# Patient Record
Sex: MALE | Race: WHITE | NOT HISPANIC OR LATINO | Employment: FULL TIME | URBAN - METROPOLITAN AREA
[De-identification: names, ages, dates, MRNs, and addresses within clinical notes are randomized per-mention and may not be internally consistent; named-entity substitution may affect disease eponyms.]

---

## 2017-03-02 ENCOUNTER — LAB (OUTPATIENT)
Dept: LAB | Facility: HOSPITAL | Age: 52
End: 2017-03-02

## 2017-03-02 ENCOUNTER — CONSULT (OUTPATIENT)
Dept: ONCOLOGY | Facility: CLINIC | Age: 52
End: 2017-03-02

## 2017-03-02 VITALS
WEIGHT: 159.8 LBS | DIASTOLIC BLOOD PRESSURE: 68 MMHG | OXYGEN SATURATION: 97 % | BODY MASS INDEX: 22.37 KG/M2 | RESPIRATION RATE: 14 BRPM | HEIGHT: 71 IN | HEART RATE: 90 BPM | SYSTOLIC BLOOD PRESSURE: 126 MMHG | TEMPERATURE: 98 F

## 2017-03-02 DIAGNOSIS — D72.820 LYMPHOCYTOSIS: Primary | ICD-10-CM

## 2017-03-02 DIAGNOSIS — D72.829 LEUKOCYTOSIS, UNSPECIFIED TYPE: Primary | ICD-10-CM

## 2017-03-02 LAB
BASOPHILS # BLD AUTO: 0.04 10*3/MM3 (ref 0–0.1)
BASOPHILS NFR BLD AUTO: 0.5 % (ref 0–1.1)
DEPRECATED RDW RBC AUTO: 46.2 FL (ref 37–49)
EOSINOPHIL # BLD AUTO: 0.16 10*3/MM3 (ref 0–0.36)
EOSINOPHIL NFR BLD AUTO: 2 % (ref 1–5)
ERYTHROCYTE [DISTWIDTH] IN BLOOD BY AUTOMATED COUNT: 13.6 % (ref 11.7–14.5)
HCT VFR BLD AUTO: 43 % (ref 40–49)
HGB BLD-MCNC: 14.4 G/DL (ref 13.5–16.5)
HOLD SPECIMEN: NORMAL
IMM GRANULOCYTES # BLD: 0.02 10*3/MM3 (ref 0–0.03)
IMM GRANULOCYTES NFR BLD: 0.3 % (ref 0–0.5)
LYMPHOCYTES # BLD AUTO: 3.01 10*3/MM3 (ref 1–3.5)
LYMPHOCYTES NFR BLD AUTO: 37.8 % (ref 20–49)
MCH RBC QN AUTO: 31.2 PG (ref 27–33)
MCHC RBC AUTO-ENTMCNC: 33.5 G/DL (ref 32–35)
MCV RBC AUTO: 93.3 FL (ref 83–97)
MONOCYTES # BLD AUTO: 0.36 10*3/MM3 (ref 0.25–0.8)
MONOCYTES NFR BLD AUTO: 4.5 % (ref 4–12)
NEUTROPHILS # BLD AUTO: 4.37 10*3/MM3 (ref 1.5–7)
NEUTROPHILS NFR BLD AUTO: 54.9 % (ref 39–75)
NRBC BLD MANUAL-RTO: 0 /100 WBC (ref 0–0)
PLATELET # BLD AUTO: 266 10*3/MM3 (ref 150–375)
PMV BLD AUTO: 10.2 FL (ref 8.9–12.1)
RBC # BLD AUTO: 4.61 10*6/MM3 (ref 4.3–5.5)
WBC NRBC COR # BLD: 7.96 10*3/MM3 (ref 4–10)
WHOLE BLOOD HOLD SPECIMEN: NORMAL

## 2017-03-02 PROCEDURE — 85025 COMPLETE CBC W/AUTO DIFF WBC: CPT | Performed by: INTERNAL MEDICINE

## 2017-03-02 PROCEDURE — 99213 OFFICE O/P EST LOW 20 MIN: CPT | Performed by: INTERNAL MEDICINE

## 2017-03-02 PROCEDURE — 36415 COLL VENOUS BLD VENIPUNCTURE: CPT | Performed by: INTERNAL MEDICINE

## 2017-03-02 RX ORDER — TADALAFIL 5 MG
TABLET ORAL
Refills: 5 | COMMUNITY
Start: 2017-01-31 | End: 2021-09-28

## 2017-03-02 RX ORDER — UBIDECARENONE 75 MG
50 CAPSULE ORAL DAILY
COMMUNITY
End: 2020-09-01

## 2017-03-02 NOTE — PROGRESS NOTES
"  Subjective   REASON FOR FOLLOW-UP:  Previously noted lymphocytosis      History of Present Illness    Mr. Fairchild is a pleasant 51-year-old man seen today in follow-up for his history of lymphocytosis.  He was previously seen in August and in December 2014 for mild lymphocytosis.  This had been associated with a 3-4 month history of myalgias, fatigue, and chills.  When he was seen in December 2014 he was feeling better with respect to any type of B symptoms but continued to have a mild lymphocytosis which was recommended to be observed.  He has been somewhat lost to follow-up, but returned today to have a repeat CBC and review.  He has been feeling well.  He denies unusual weight loss, fatigue, night sweats, or lymphadenopathy.  He had a recent normal CBC with his primary care physician.  He continues to smoke tobacco.    PAST MEDICAL HISTORY:   1. Paroxysmal atrial fibrillation for which he underwent an ablation procedure in 2010.   2. He has had hernia repair, vasectomy and appendectomy.            SOCIAL HISTORY: He is . Works as a  for [employer]. . He drinks 1 or 2 beers per week, denies risk factors for HIV or hepatitis.  He is smoking again.      FAMILY HISTORY: He is adopted.     Review of Systems   A comprehensive 14 point review of systems was performed and was negative except as mentioned.        ALLERGIES:    Allergies   Allergen Reactions   • Penicillins Shortness Of Breath and Swelling       Objective      Vitals:    03/02/17 1334   BP: 126/68   Pulse: 90   Resp: 14   Temp: 98 °F (36.7 °C)   SpO2: 97%   Weight: 159 lb 12.8 oz (72.5 kg)   Height: 71.26\" (181 cm)  Comment: new ht/pt   PainSc: 0-No pain     Current Status 3/2/2017   ECOG score 0       Physical Exam   Constitutional: He appears well-developed and well-nourished.   HENT:   Head: Normocephalic.   Eyes: No scleral icterus.   Cardiovascular: Normal rate.    Pulmonary/Chest: Effort normal.   Abdominal: Soft. He exhibits no mass. "   Musculoskeletal: He exhibits no edema.   Lymphadenopathy:     He has no cervical adenopathy.     He has no axillary adenopathy.        Right: No supraclavicular adenopathy present.        Left: No supraclavicular adenopathy present.   Skin: Skin is warm and dry. No pallor.   Psychiatric: He has a normal mood and affect.         RECENT LABS:  Hematology WBC   Date Value Ref Range Status   03/02/2017 7.96 4.00 - 10.00 10*3/mm3 Final   07/08/2014 11.33 (H) 4.50 - 10.70 K/Cumm Final     RBC   Date Value Ref Range Status   03/02/2017 4.61 4.30 - 5.50 10*6/mm3 Final   07/08/2014 4.13 (L) 4.60 - 6.00 Million Final     HEMOGLOBIN   Date Value Ref Range Status   03/02/2017 14.4 13.5 - 16.5 g/dL Final   07/08/2014 12.4 (L) 13.7 - 17.6 g/dL Final     HEMATOCRIT   Date Value Ref Range Status   03/02/2017 43.0 40.0 - 49.0 % Final   07/08/2014 36.7 (L) 40.4 - 52.2 % Final     PLATELETS   Date Value Ref Range Status   03/02/2017 266 150 - 375 10*3/mm3 Final   07/08/2014 297 140 - 500 K/Cumm Final              Assessment/Plan   Gurinder is a very pleasant 51-year-old man who was noted to have mild lymphocytosis in 2014 associated with myalgias, fatigue, and chills.  I reviewed today CBC which is entirely normal including a lymphocyte count of 3.01, normal hemoglobin, and normal platelet.  His exam shows no lymphadenopathy or splenomegaly.  He is having no B symptoms.  At this time, I see nothing to suggest a lymphoproliferative disorder.  He was reassured.    I recommended that he continue care with his primary care physician.  I would recommend a yearly CBC with differential.  If lymphocytosis is noted in the future, we would be happy to see him back for further evaluation.                  3/2/2017      CC:

## 2020-09-01 ENCOUNTER — OFFICE VISIT (OUTPATIENT)
Dept: CARDIOLOGY | Facility: CLINIC | Age: 55
End: 2020-09-01

## 2020-09-01 VITALS
BODY MASS INDEX: 24.08 KG/M2 | WEIGHT: 172 LBS | DIASTOLIC BLOOD PRESSURE: 86 MMHG | HEIGHT: 71 IN | SYSTOLIC BLOOD PRESSURE: 130 MMHG | HEART RATE: 91 BPM

## 2020-09-01 DIAGNOSIS — I48.0 PAF (PAROXYSMAL ATRIAL FIBRILLATION) (HCC): ICD-10-CM

## 2020-09-01 DIAGNOSIS — R07.89 OTHER CHEST PAIN: Primary | ICD-10-CM

## 2020-09-01 PROCEDURE — 99214 OFFICE O/P EST MOD 30 MIN: CPT | Performed by: INTERNAL MEDICINE

## 2020-09-01 NOTE — PROGRESS NOTES
Subjective:     Encounter Date: 09/01/20        Patient ID: Domo Fairchild is a 55 y.o. male.    Chief Complaint: CP  History of Present Illness    Dear Dr. Amin,    I had the pleasure seeing this patient in the office today.  He has a history of atrial fibrillation and had an A. fib ablation by Dr. Man in 2011.  I have seen him once in 2016.    He comes in because of chest discomfort.  He was having some right-sided chest discomfort, on the flank, that was fairly constant.  He went into the urgent care center and they felt that this was musculoskeletal.  He then had some left-sided parasternal chest discomfort.  He really did not notice anything that would particularly bring it on.  Sometimes he had with activity and sometimes not.  He walks about 10,000 steps a day at his job at [employer] and has not noticed any particular correlation with walking at work or activity or exercise at home.  No shortness of breath.  No palpitations or tachycardia.  He has not had any recurrent problems with atrial fibrillation that he is aware of.  No lower extremity edema.  No chills or fevers.  He has not had COVID-19 and no exposure that he knows of.  He has had a COVID antibody test that was negative and has had 2 COVID test that were negative because of work.  He was in to see you and then was referred over for evaluation of his chest discomfort.      The following portions of the patient's history were reviewed and updated as appropriate: allergies, current medications, past family history, past medical history, past social history, past surgical history and problem list.    Past Medical History:   Diagnosis Date   • Atrial fibrillation (CMS/HCC)    • Depression    • PAF (paroxysmal atrial fibrillation) (CMS/HCC)     Underwent an ablation procedure in 2010.   • SOB (shortness of breath)    • Tachycardia        Past Surgical History:   Procedure Laterality Date   • APPENDECTOMY  02/1978   • CARDIAC ABLATION   02/2010    [facility]   • HERNIA REPAIR Left 04/2006    Inguinal hernia, Dr. Coleman Hatch   • HERNIA REPAIR Right 02/2014    Inguinal hernia, Dr. Coleman Hatch   • INGUINAL HERNIA REPAIR  03/29/2007    Coleman Cruz   • VASECTOMY  07/1997       Social History     Socioeconomic History   • Marital status: Single     Spouse name: Not on file   • Number of children: Not on file   • Years of education: College   • Highest education level: Not on file   Occupational History   • Occupation:      Employer: [employer]   Tobacco Use   • Smoking status: Current Every Day Smoker     Packs/day: 1.00     Years: 30.00     Pack years: 30.00     Types: Cigarettes   • Smokeless tobacco: Never Used   Substance and Sexual Activity   • Alcohol use: Yes     Alcohol/week: 2.0 standard drinks     Types: 2 Shots of liquor per week     Comment: 1-2 beers   • Drug use: No       Review of Systems   Constitution: Negative for chills, decreased appetite, fever and night sweats.   HENT: Negative for ear discharge, ear pain, hearing loss, nosebleeds and sore throat.    Eyes: Negative for blurred vision, double vision and pain.   Cardiovascular: Negative for cyanosis.   Respiratory: Negative for hemoptysis and sputum production.    Endocrine: Negative for cold intolerance and heat intolerance.   Hematologic/Lymphatic: Negative for adenopathy.   Skin: Negative for dry skin, itching, nail changes, rash and suspicious lesions.   Musculoskeletal: Negative for arthritis, gout, muscle cramps, muscle weakness, myalgias and neck pain.   Gastrointestinal: Negative for anorexia, bowel incontinence, constipation, diarrhea, dysphagia, hematemesis and jaundice.   Genitourinary: Negative for bladder incontinence, dysuria, flank pain, frequency, hematuria and nocturia.   Neurological: Negative for focal weakness, numbness, paresthesias and seizures.   Psychiatric/Behavioral: Negative for altered mental status,  "hallucinations, hypervigilance, suicidal ideas and thoughts of violence.   Allergic/Immunologic: Negative for persistent infections.       Procedures  I have reviewed the EKG in your office.  No acute changes seen.     Objective:     Vitals:    09/01/20 0915   BP: 130/86   Pulse: 91   Weight: 78 kg (172 lb)   Height: 181 cm (71.26\")         Physical Exam   Constitutional: He is oriented to person, place, and time. He appears well-developed and well-nourished. No distress.   HENT:   Head: Normocephalic and atraumatic.   Nose: Nose normal.   Mouth/Throat: Oropharynx is clear and moist.   Eyes: Pupils are equal, round, and reactive to light. Conjunctivae and EOM are normal. Right eye exhibits no discharge. Left eye exhibits no discharge.   Neck: Normal range of motion. Neck supple. No tracheal deviation present. No thyromegaly present.   Cardiovascular: Normal rate, regular rhythm, S1 normal, S2 normal, normal heart sounds and normal pulses. Exam reveals no S3.   Pulmonary/Chest: Effort normal and breath sounds normal. No stridor. No respiratory distress. He exhibits no tenderness.   Abdominal: Soft. Bowel sounds are normal. He exhibits no distension and no mass. There is no tenderness. There is no rebound and no guarding.   Musculoskeletal: Normal range of motion. He exhibits no tenderness or deformity.   Lymphadenopathy:     He has no cervical adenopathy.   Neurological: He is alert and oriented to person, place, and time. He has normal reflexes.   Skin: Skin is warm and dry. No rash noted. He is not diaphoretic. No erythema.   Psychiatric: He has a normal mood and affect. Thought content normal.       Lab Review:           Performed      Assessment:          Diagnosis Plan   1. Other chest pain  Treadmill Stress Test   2. PAF (paroxysmal atrial fibrillation) (CMS/[facility])  Treadmill Stress Test          Plan:       1.  Chest discomfort-with both typical and atypical components, lower pretest probability of ischemia, we " will arrange for an exercise stress test to be performed.  Baseline EKG is normal  2.  History of paroxysmal atrial fibrillation, status post A. fib ablation by Dr. Man, no change from prior,  3.  Tobacco dependence-smoking cessation is recommended.    Thank you very much for allowing us to participate in the care of this pleasant patient.  Please don't hesitate to call if I can be of assistance in any way.      Current Outpatient Medications:   •  CIALIS 5 MG tablet, TAKE 1 TABLET BY MOUTH DAILY, Disp: , Rfl: 5

## 2020-09-14 ENCOUNTER — HOSPITAL ENCOUNTER (OUTPATIENT)
Dept: CARDIOLOGY | Facility: HOSPITAL | Age: 55
Discharge: HOME | End: 2020-09-14
Admitting: INTERNAL MEDICINE

## 2020-09-14 DIAGNOSIS — I48.0 PAF (PAROXYSMAL ATRIAL FIBRILLATION) (HCC): ICD-10-CM

## 2020-09-14 DIAGNOSIS — R07.89 OTHER CHEST PAIN: ICD-10-CM

## 2020-09-14 LAB
BH CV STRESS BP STAGE 1: NORMAL
BH CV STRESS BP STAGE 2: NORMAL
BH CV STRESS BP STAGE 3: NORMAL
BH CV STRESS DURATION MIN STAGE 1: 3
BH CV STRESS DURATION MIN STAGE 2: 3
BH CV STRESS DURATION MIN STAGE 3: 3
BH CV STRESS DURATION SEC STAGE 1: 0
BH CV STRESS DURATION SEC STAGE 2: 0
BH CV STRESS DURATION SEC STAGE 3: 0
BH CV STRESS GRADE STAGE 1: 10
BH CV STRESS GRADE STAGE 2: 12
BH CV STRESS GRADE STAGE 3: 14
BH CV STRESS HR STAGE 1: 125
BH CV STRESS HR STAGE 2: 148
BH CV STRESS HR STAGE 3: 169
BH CV STRESS METS STAGE 1: 5
BH CV STRESS METS STAGE 2: 7.5
BH CV STRESS METS STAGE 3: 10
BH CV STRESS PROTOCOL 1: NORMAL
BH CV STRESS RECOVERY BP: NORMAL MMHG
BH CV STRESS RECOVERY HR: 99 BPM
BH CV STRESS SPEED STAGE 1: 1.7
BH CV STRESS SPEED STAGE 2: 2.5
BH CV STRESS SPEED STAGE 3: 3.4
BH CV STRESS STAGE 1: 1
BH CV STRESS STAGE 2: 2
BH CV STRESS STAGE 3: 3
MAXIMAL PREDICTED HEART RATE: 167 BPM
PERCENT MAX PREDICTED HR: 101.2 %
STRESS BASELINE BP: NORMAL MMHG
STRESS BASELINE HR: 90 BPM
STRESS PERCENT HR: 119 %
STRESS POST ESTIMATED WORKLOAD: 10 METS
STRESS POST EXERCISE DUR MIN: 9 MIN
STRESS POST EXERCISE DUR SEC: 0 SEC
STRESS POST PEAK BP: NORMAL MMHG
STRESS POST PEAK HR: 169 BPM
STRESS TARGET HR: 142 BPM

## 2020-09-14 PROCEDURE — 93017 CV STRESS TEST TRACING ONLY: CPT

## 2020-09-14 PROCEDURE — 93018 CV STRESS TEST I&R ONLY: CPT | Performed by: INTERNAL MEDICINE

## 2020-09-14 PROCEDURE — 93016 CV STRESS TEST SUPVJ ONLY: CPT | Performed by: INTERNAL MEDICINE

## 2021-03-24 ENCOUNTER — BULK ORDERING (OUTPATIENT)
Dept: CASE MANAGEMENT | Facility: OTHER | Age: 56
End: 2021-03-24

## 2021-03-24 DIAGNOSIS — Z23 IMMUNIZATION DUE: ICD-10-CM

## 2021-04-09 ENCOUNTER — TRANSCRIBE ORDERS (OUTPATIENT)
Dept: ADMINISTRATIVE | Facility: HOSPITAL | Age: 56
End: 2021-04-09

## 2021-04-09 DIAGNOSIS — Z12.2 ENCOUNTER FOR SCREENING FOR LUNG CANCER: ICD-10-CM

## 2021-04-09 DIAGNOSIS — F17.200 SMOKER: Primary | ICD-10-CM

## 2021-04-30 ENCOUNTER — HOSPITAL ENCOUNTER (OUTPATIENT)
Dept: CT IMAGING | Facility: HOSPITAL | Age: 56
Discharge: HOME | End: 2021-04-30
Admitting: FAMILY MEDICINE

## 2021-04-30 DIAGNOSIS — Z12.2 ENCOUNTER FOR SCREENING FOR LUNG CANCER: ICD-10-CM

## 2021-04-30 DIAGNOSIS — F17.200 SMOKER: ICD-10-CM

## 2021-04-30 PROCEDURE — 71271 CT THORAX LUNG CANCER SCR C-: CPT

## 2021-06-30 ENCOUNTER — APPOINTMENT (OUTPATIENT)
Dept: GENERAL RADIOLOGY | Facility: HOSPITAL | Age: 56
End: 2021-06-30

## 2021-06-30 ENCOUNTER — HOSPITAL ENCOUNTER (OUTPATIENT)
Facility: HOSPITAL | Age: 56
Setting detail: OBSERVATION
Discharge: HOME | End: 2021-07-01
Attending: EMERGENCY MEDICINE | Admitting: INTERNAL MEDICINE

## 2021-06-30 DIAGNOSIS — I48.91 ATRIAL FIBRILLATION WITH RVR (HCC): Primary | ICD-10-CM

## 2021-06-30 DIAGNOSIS — R94.31 ABNORMAL EKG: ICD-10-CM

## 2021-06-30 LAB
ALBUMIN SERPL-MCNC: 4.3 G/DL (ref 3.5–5.2)
ALBUMIN/GLOB SERPL: 1.5 G/DL
ALP SERPL-CCNC: 99 U/L (ref 39–117)
ALT SERPL W P-5'-P-CCNC: 17 U/L (ref 1–41)
ANION GAP SERPL CALCULATED.3IONS-SCNC: 8.1 MMOL/L (ref 5–15)
AST SERPL-CCNC: 15 U/L (ref 1–40)
BASOPHILS # BLD AUTO: 0.07 10*3/MM3 (ref 0–0.2)
BASOPHILS NFR BLD AUTO: 0.7 % (ref 0–1.5)
BILIRUB SERPL-MCNC: 0.4 MG/DL (ref 0–1.2)
BUN SERPL-MCNC: 10 MG/DL (ref 6–20)
BUN/CREAT SERPL: 14.7 (ref 7–25)
CALCIUM SPEC-SCNC: 8.9 MG/DL (ref 8.6–10.5)
CHLORIDE SERPL-SCNC: 104 MMOL/L (ref 98–107)
CO2 SERPL-SCNC: 25.9 MMOL/L (ref 22–29)
CREAT SERPL-MCNC: 0.68 MG/DL (ref 0.76–1.27)
DEPRECATED RDW RBC AUTO: 44.7 FL (ref 37–54)
EOSINOPHIL # BLD AUTO: 0.11 10*3/MM3 (ref 0–0.4)
EOSINOPHIL NFR BLD AUTO: 1.1 % (ref 0.3–6.2)
ERYTHROCYTE [DISTWIDTH] IN BLOOD BY AUTOMATED COUNT: 13.1 % (ref 12.3–15.4)
GFR SERPL CREATININE-BSD FRML MDRD: 122 ML/MIN/1.73
GLOBULIN UR ELPH-MCNC: 2.8 GM/DL
GLUCOSE SERPL-MCNC: 105 MG/DL (ref 65–99)
HCT VFR BLD AUTO: 44.1 % (ref 37.5–51)
HGB BLD-MCNC: 15 G/DL (ref 13–17.7)
IMM GRANULOCYTES # BLD AUTO: 0.03 10*3/MM3 (ref 0–0.05)
IMM GRANULOCYTES NFR BLD AUTO: 0.3 % (ref 0–0.5)
LYMPHOCYTES # BLD AUTO: 2.89 10*3/MM3 (ref 0.7–3.1)
LYMPHOCYTES NFR BLD AUTO: 30.2 % (ref 19.6–45.3)
MCH RBC QN AUTO: 31.3 PG (ref 26.6–33)
MCHC RBC AUTO-ENTMCNC: 34 G/DL (ref 31.5–35.7)
MCV RBC AUTO: 91.9 FL (ref 79–97)
MONOCYTES # BLD AUTO: 0.54 10*3/MM3 (ref 0.1–0.9)
MONOCYTES NFR BLD AUTO: 5.6 % (ref 5–12)
NEUTROPHILS NFR BLD AUTO: 5.93 10*3/MM3 (ref 1.7–7)
NEUTROPHILS NFR BLD AUTO: 62.1 % (ref 42.7–76)
NRBC BLD AUTO-RTO: 0 /100 WBC (ref 0–0.2)
PLATELET # BLD AUTO: 300 10*3/MM3 (ref 140–450)
PMV BLD AUTO: 10.4 FL (ref 6–12)
POTASSIUM SERPL-SCNC: 4.1 MMOL/L (ref 3.5–5.2)
PROT SERPL-MCNC: 7.1 G/DL (ref 6–8.5)
QT INTERVAL: 320 MS
QT INTERVAL: 349 MS
QT INTERVAL: 359 MS
RBC # BLD AUTO: 4.8 10*6/MM3 (ref 4.14–5.8)
SARS-COV-2 RNA RESP QL NAA+PROBE: NOT DETECTED
SODIUM SERPL-SCNC: 138 MMOL/L (ref 136–145)
TROPONIN T SERPL-MCNC: <0.01 NG/ML (ref 0–0.03)
TROPONIN T SERPL-MCNC: <0.01 NG/ML (ref 0–0.03)
WBC # BLD AUTO: 9.57 10*3/MM3 (ref 3.4–10.8)

## 2021-06-30 PROCEDURE — U0003 INFECTIOUS AGENT DETECTION BY NUCLEIC ACID (DNA OR RNA); SEVERE ACUTE RESPIRATORY SYNDROME CORONAVIRUS 2 (SARS-COV-2) (CORONAVIRUS DISEASE [COVID-19]), AMPLIFIED PROBE TECHNIQUE, MAKING USE OF HIGH THROUGHPUT TECHNOLOGIES AS DESCRIBED BY CMS-2020-01-R: HCPCS | Performed by: NURSE PRACTITIONER

## 2021-06-30 PROCEDURE — 84484 ASSAY OF TROPONIN QUANT: CPT | Performed by: NURSE PRACTITIONER

## 2021-06-30 PROCEDURE — 96375 TX/PRO/DX INJ NEW DRUG ADDON: CPT

## 2021-06-30 PROCEDURE — 85025 COMPLETE CBC W/AUTO DIFF WBC: CPT | Performed by: NURSE PRACTITIONER

## 2021-06-30 PROCEDURE — 99219 PR INITIAL OBSERVATION CARE/DAY 50 MINUTES: CPT | Performed by: INTERNAL MEDICINE

## 2021-06-30 PROCEDURE — 99284 EMERGENCY DEPT VISIT MOD MDM: CPT

## 2021-06-30 PROCEDURE — 93010 ELECTROCARDIOGRAM REPORT: CPT | Performed by: INTERNAL MEDICINE

## 2021-06-30 PROCEDURE — 25010000002 ONDANSETRON PER 1 MG: Performed by: NURSE PRACTITIONER

## 2021-06-30 PROCEDURE — G0378 HOSPITAL OBSERVATION PER HR: HCPCS

## 2021-06-30 PROCEDURE — 93005 ELECTROCARDIOGRAM TRACING: CPT | Performed by: NURSE PRACTITIONER

## 2021-06-30 PROCEDURE — 80053 COMPREHEN METABOLIC PANEL: CPT | Performed by: NURSE PRACTITIONER

## 2021-06-30 PROCEDURE — 71045 X-RAY EXAM CHEST 1 VIEW: CPT

## 2021-06-30 PROCEDURE — C9803 HOPD COVID-19 SPEC COLLECT: HCPCS

## 2021-06-30 PROCEDURE — 93005 ELECTROCARDIOGRAM TRACING: CPT | Performed by: EMERGENCY MEDICINE

## 2021-06-30 PROCEDURE — 99244 OFF/OP CNSLTJ NEW/EST MOD 40: CPT | Performed by: INTERNAL MEDICINE

## 2021-06-30 PROCEDURE — 96374 THER/PROPH/DIAG INJ IV PUSH: CPT

## 2021-06-30 RX ORDER — ASPIRIN 325 MG
325 TABLET ORAL ONCE
Status: COMPLETED | OUTPATIENT
Start: 2021-06-30 | End: 2021-06-30

## 2021-06-30 RX ORDER — NITROGLYCERIN 0.4 MG/1
0.4 TABLET SUBLINGUAL
Status: DISCONTINUED | OUTPATIENT
Start: 2021-06-30 | End: 2021-07-01 | Stop reason: HOSPADM

## 2021-06-30 RX ORDER — SODIUM CHLORIDE 0.9 % (FLUSH) 0.9 %
10 SYRINGE (ML) INJECTION EVERY 12 HOURS SCHEDULED
Status: DISCONTINUED | OUTPATIENT
Start: 2021-06-30 | End: 2021-07-01 | Stop reason: HOSPADM

## 2021-06-30 RX ORDER — ACETAMINOPHEN 160 MG/5ML
650 SOLUTION ORAL EVERY 4 HOURS PRN
Status: DISCONTINUED | OUTPATIENT
Start: 2021-06-30 | End: 2021-07-01 | Stop reason: HOSPADM

## 2021-06-30 RX ORDER — METOPROLOL TARTRATE 5 MG/5ML
INJECTION INTRAVENOUS
Status: COMPLETED
Start: 2021-06-30 | End: 2021-06-30

## 2021-06-30 RX ORDER — ACETAMINOPHEN 325 MG/1
650 TABLET ORAL EVERY 4 HOURS PRN
Status: DISCONTINUED | OUTPATIENT
Start: 2021-06-30 | End: 2021-07-01 | Stop reason: HOSPADM

## 2021-06-30 RX ORDER — ACETAMINOPHEN 650 MG/1
650 SUPPOSITORY RECTAL EVERY 4 HOURS PRN
Status: DISCONTINUED | OUTPATIENT
Start: 2021-06-30 | End: 2021-07-01 | Stop reason: HOSPADM

## 2021-06-30 RX ORDER — SODIUM CHLORIDE 0.9 % (FLUSH) 0.9 %
10 SYRINGE (ML) INJECTION AS NEEDED
Status: DISCONTINUED | OUTPATIENT
Start: 2021-06-30 | End: 2021-07-01 | Stop reason: HOSPADM

## 2021-06-30 RX ORDER — ONDANSETRON 2 MG/ML
4 INJECTION INTRAMUSCULAR; INTRAVENOUS ONCE
Status: COMPLETED | OUTPATIENT
Start: 2021-06-30 | End: 2021-06-30

## 2021-06-30 RX ADMIN — ONDANSETRON 4 MG: 2 INJECTION INTRAMUSCULAR; INTRAVENOUS at 10:51

## 2021-06-30 RX ADMIN — ASPIRIN 325 MG: 325 TABLET ORAL at 11:04

## 2021-06-30 RX ADMIN — SODIUM CHLORIDE, PRESERVATIVE FREE 10 ML: 5 INJECTION INTRAVENOUS at 21:36

## 2021-06-30 RX ADMIN — METOROPROLOL TARTRATE 5 MG: 5 INJECTION, SOLUTION INTRAVENOUS at 09:49

## 2021-06-30 RX ADMIN — SODIUM CHLORIDE, POTASSIUM CHLORIDE, SODIUM LACTATE AND CALCIUM CHLORIDE 1000 ML: 600; 310; 30; 20 INJECTION, SOLUTION INTRAVENOUS at 11:07

## 2021-06-30 RX ADMIN — APIXABAN 5 MG: 5 TABLET, FILM COATED ORAL at 21:35

## 2021-06-30 RX ADMIN — SERTRALINE HYDROCHLORIDE 50 MG: 50 TABLET, FILM COATED ORAL at 15:55

## 2021-06-30 RX ADMIN — METOPROLOL TARTRATE 25 MG: 25 TABLET, FILM COATED ORAL at 21:35

## 2021-07-01 ENCOUNTER — APPOINTMENT (OUTPATIENT)
Dept: NUCLEAR MEDICINE | Facility: HOSPITAL | Age: 56
End: 2021-07-01

## 2021-07-01 ENCOUNTER — APPOINTMENT (OUTPATIENT)
Dept: CARDIOLOGY | Facility: HOSPITAL | Age: 56
End: 2021-07-01

## 2021-07-01 VITALS
BODY MASS INDEX: 24.95 KG/M2 | TEMPERATURE: 98.2 F | WEIGHT: 174.3 LBS | HEIGHT: 70 IN | HEART RATE: 84 BPM | RESPIRATION RATE: 16 BRPM | SYSTOLIC BLOOD PRESSURE: 123 MMHG | OXYGEN SATURATION: 95 % | DIASTOLIC BLOOD PRESSURE: 88 MMHG

## 2021-07-01 LAB
BH CV REST NUCLEAR ISOTOPE DOSE: 11.3 MCI
BH CV STRESS COMMENTS STAGE 1: NORMAL
BH CV STRESS DOSE REGADENOSON STAGE 1: 0.4
BH CV STRESS DURATION MIN STAGE 1: 0
BH CV STRESS DURATION SEC STAGE 1: 10
BH CV STRESS NUCLEAR ISOTOPE DOSE: 33 MCI
BH CV STRESS PROTOCOL 1: NORMAL
BH CV STRESS RECOVERY BP: NORMAL MMHG
BH CV STRESS RECOVERY HR: 120 BPM
BH CV STRESS STAGE 1: 1
LV EF NUC BP: 44 %
MAXIMAL PREDICTED HEART RATE: 166 BPM
PERCENT MAX PREDICTED HR: 81.33 %
QT INTERVAL: 402 MS
STRESS BASELINE BP: NORMAL MMHG
STRESS BASELINE HR: 96 BPM
STRESS PERCENT HR: 96 %
STRESS POST PEAK BP: NORMAL MMHG
STRESS POST PEAK HR: 135 BPM
STRESS TARGET HR: 141 BPM

## 2021-07-01 PROCEDURE — 93010 ELECTROCARDIOGRAM REPORT: CPT | Performed by: INTERNAL MEDICINE

## 2021-07-01 PROCEDURE — 78452 HT MUSCLE IMAGE SPECT MULT: CPT

## 2021-07-01 PROCEDURE — G0378 HOSPITAL OBSERVATION PER HR: HCPCS

## 2021-07-01 PROCEDURE — 0 TECHNETIUM SESTAMIBI: Performed by: INTERNAL MEDICINE

## 2021-07-01 PROCEDURE — 92960 CARDIOVERSION ELECTRIC EXT: CPT | Performed by: INTERNAL MEDICINE

## 2021-07-01 PROCEDURE — 99217 PR OBSERVATION CARE DISCHARGE MANAGEMENT: CPT | Performed by: INTERNAL MEDICINE

## 2021-07-01 PROCEDURE — 25010000002 REGADENOSON 0.4 MG/5ML SOLUTION: Performed by: INTERNAL MEDICINE

## 2021-07-01 PROCEDURE — 93005 ELECTROCARDIOGRAM TRACING: CPT | Performed by: NURSE PRACTITIONER

## 2021-07-01 PROCEDURE — 92960 CARDIOVERSION ELECTRIC EXT: CPT

## 2021-07-01 PROCEDURE — 78452 HT MUSCLE IMAGE SPECT MULT: CPT | Performed by: INTERNAL MEDICINE

## 2021-07-01 PROCEDURE — 93017 CV STRESS TEST TRACING ONLY: CPT

## 2021-07-01 PROCEDURE — 93018 CV STRESS TEST I&R ONLY: CPT | Performed by: INTERNAL MEDICINE

## 2021-07-01 PROCEDURE — 93016 CV STRESS TEST SUPVJ ONLY: CPT | Performed by: INTERNAL MEDICINE

## 2021-07-01 PROCEDURE — A9500 TC99M SESTAMIBI: HCPCS | Performed by: INTERNAL MEDICINE

## 2021-07-01 RX ORDER — SODIUM CHLORIDE 9 MG/ML
INJECTION, SOLUTION INTRAVENOUS
Status: COMPLETED | OUTPATIENT
Start: 2021-07-01 | End: 2021-07-01

## 2021-07-01 RX ADMIN — METOPROLOL TARTRATE 25 MG: 25 TABLET, FILM COATED ORAL at 12:57

## 2021-07-01 RX ADMIN — SERTRALINE HYDROCHLORIDE 50 MG: 50 TABLET, FILM COATED ORAL at 12:56

## 2021-07-01 RX ADMIN — METHOHEXITAL SODIUM 60 MG: 500 INJECTION, POWDER, LYOPHILIZED, FOR SOLUTION INTRAMUSCULAR; INTRAVENOUS; RECTAL at 12:34

## 2021-07-01 RX ADMIN — TECHNETIUM TC 99M SESTAMIBI 1 DOSE: 1 INJECTION INTRAVENOUS at 08:28

## 2021-07-01 RX ADMIN — SODIUM CHLORIDE, PRESERVATIVE FREE 10 ML: 5 INJECTION INTRAVENOUS at 12:58

## 2021-07-01 RX ADMIN — APIXABAN 5 MG: 5 TABLET, FILM COATED ORAL at 12:57

## 2021-07-01 RX ADMIN — TECHNETIUM TC 99M SESTAMIBI 1 DOSE: 1 INJECTION INTRAVENOUS at 06:25

## 2021-07-01 RX ADMIN — SODIUM CHLORIDE 50 ML/HR: 9 INJECTION, SOLUTION INTRAVENOUS at 12:25

## 2021-07-01 RX ADMIN — REGADENOSON 0.4 MG: 0.08 INJECTION, SOLUTION INTRAVENOUS at 08:28

## 2021-07-01 NOTE — NURSING NOTE
Patient npo per verbal order, stress test done, meds not given this am as patient is off floor for med pass, was going to verify eliquis vs heparin drip order for possible cardioversion no consent printed, unable to verify procedure with health care provider prior to cath lab acquiring patient for procedure. Cath lab called spoke with rn, will continue to monitor.

## 2021-07-01 NOTE — DISCHARGE SUMMARY
Hospital Discharge    Patient Name: Domo Fairchild  Age/Sex: 56 y.o. male  : 1965  MRN: [id number]    Encounter Provider: Otoniel Delacruz III, MD  Referring Provider: Otoniel Delacruz III, MD  Place of Service: [facility] CARDIOLOGY  Patient Care Team:  Ngoc Amin MD as PCP - General (Family Medicine)  Mehreen Boucher MD as Referring Physician (Family Medicine)  Esteban Trinidad MD as Consulting Physician (Hematology and Oncology)         Date of Discharge:  2021   Date of Admit: 2021    Discharge Condition: Good  Discharge Diagnosis:    Atrial fibrillation with RVR (CMS/HCC)    Abnormal EKG      Hospital Course:   Domo Fairchild is a 56 y.o. male who presented with paroxysmal/recurrent atrial fibrillation with RVR.  He was given IV metoprolol emergency room.  He had an episode of severe diaphoresis and the emergency room physician was concerned about acute coronary syndrome.  Troponins were negative.  He was admitted.  He ruled out for myocardial infarction.  He then had a Lexiscan Cardiolite that showed no ischemia.    Regarding the A. fib, he was well controlled with metoprolol.  He was started on Eliquis.  Dr. Finch of electrophysiology was consulted, saw the patient, and then performed a cardioversion this morning.  Patient is now in sinus rhythm, feels good, and is ready to go home.    He will be discharged home on the below meds.  He will follow up in the EP clinic.  Preliminary discussions have been had regarding a medication versus repeat ablation as a long-term strategy; that plan will be finalized at his next clinic visit.    Objective:  Temp:  [97.9 °F (36.6 °C)-98.4 °F (36.9 °C)] 98.1 °F (36.7 °C)  Heart Rate:  [] 78  Resp:  [16-18] 16  BP: (113-137)/() 132/105    Intake/Output Summary (Last 24 hours) at 2021 1420  Last data filed at 2021 1730  Gross per 24 hour   Intake 600 ml   Output --   Net 600 ml     Body  mass index is 25.01 kg/m².      06/30/21  0949 06/30/21  1333   Weight: 79.4 kg (175 lb) 79.1 kg (174 lb 4.8 oz)     Weight change:     Physical Exam:  General Appearance:    Alert, cooperative, in no acute distress   Head:    Normocephalic, without obvious abnormality, atraumatic   Eyes:            Lids and lashes normal, conjunctivae and sclerae normal, no   icterus, no pallor, corneas clear, PERRLA   Ears:    Ears appear intact with no abnormalities noted   Throat:   No oral lesions, no thrush, oral mucosa moist   Neck:   No adenopathy, supple, trachea midline, no thyromegaly, no   carotid bruit, no JVD   Back:     No kyphosis present, no scoliosis present, no skin lesions, erythema or scars, no tenderness to percussion or palpation, range of motion normal   Lungs:     Clear to auscultation,respirations regular, even and unlabored    Heart:    Regular rhythm and normal rate, normal S1 and S2, no murmur, no gallop, no rub, no click   Chest Wall:    No abnormalities observed   Abdomen:     Normal bowel sounds, no masses, no organomegaly, soft        non-tender, non-distended, no guarding, no rebound  tenderness   Extremities:   Moves all extremities well, no edema, no cyanosis, no redness   Pulses:   Pulses palpable and equal bilaterally. Normal radial, carotid, femoral, dorsalis pedis and posterior tibial pulses bilaterally. Normal abdominal aorta   Skin:  Psychiatric:   No bleeding, bruising or rash    Alert and oriented x 3, normal mood and affect         Procedures Performed         Consults:  Consults     Date and Time Order Name Status Description    6/30/2021  2:29 PM Cardiac Electrophysiologist Inpatient Consult            Pertinent Test Results:  Results from last 7 days   Lab Units 06/30/21  0952   SODIUM mmol/L 138   POTASSIUM mmol/L 4.1   CHLORIDE mmol/L 104   CO2 mmol/L 25.9   BUN mg/dL 10   CREATININE mg/dL 0.68*   GLUCOSE mg/dL 105*   CALCIUM mg/dL 8.9   AST (SGOT) U/L 15   ALT (SGPT) U/L 17      Results from last 7 days   Lab Units 06/30/21  1057 06/30/21  0952   TROPONIN T ng/mL <0.010 <0.010     Results from last 7 days   Lab Units 06/30/21  0952   WBC 10*3/mm3 9.57   HEMOGLOBIN g/dL 15.0   HEMATOCRIT % 44.1   PLATELETS 10*3/mm3 300                   Invalid input(s): LDLCALC            Discharge Medications     Discharge Medications      New Medications      Instructions Start Date   apixaban 5 MG tablet tablet  Commonly known as: ELIQUIS   5 mg, Oral, Every 12 Hours Scheduled      metoprolol tartrate 25 MG tablet  Commonly known as: LOPRESSOR   25 mg, Oral, Every 12 Hours Scheduled         Continue These Medications      Instructions Start Date   Cialis 5 MG tablet  Generic drug: tadalafil   TAKE 1 TABLET BY MOUTH DAILY      sertraline 50 MG tablet  Commonly known as: ZOLOFT   50 mg, Oral, Daily, Just started this medication yesterday.           AFTER THIS WAS PREPARED, NOTIFIED THAT ELIQUIS WAS NOT COVERED AND NEEDED XARELTO WHICH WAS ORDERED  Discharge Diet:    Dietary Orders (From admission, onward)     Start     Ordered    07/01/21 1418  Diet Regular  Diet Effective Now     Question:  Diet Texture / Consistency  Answer:  Regular    07/01/21 1417                Activity at Discharge:       Discharge disposition: home     Discharge Instructions and Follow ups:  No future appointments.  Additional Instructions for the Follow-ups that You Need to Schedule     Discharge Follow-up with Specified Provider: Dr Finch; 1 Month   As directed      To: Dr Finch    Follow Up: 1 Month           Follow-up Information     Ngoc Amin MD .    Specialty: Family Medicine  Contact information:  [address] [place] [id number] [phone]                   Test Results Pending at Discharge:      Otoniel Delacruz III, MD  07/01/21  14:20 EDT    Time: Discharge 20 min    EMR Dragon/Transcription disclaimer:   Much of this encounter note is an electronic transcription/translation of spoken  language to printed text. The electronic translation of spoken language may permit erroneous, or at times, nonsensical words or phrases to be inadvertently transcribed; Although I have reviewed the note for such errors, some may still exist.

## 2021-07-01 NOTE — NURSING NOTE
Discharge instructions and follow up visits reviewed, no acute distress noted, patient refuses transportation and wants to walk to in house outpatient pharmacy to pay for medications and  prior to discharge. Fiance is ambulating with patient and patient is discharged

## 2021-07-01 NOTE — PROGRESS NOTES
Pharmacy Services-Xarelto Education    Domo Fairchild is a new start on Xarelto for new onset atrial fibrillation (insurance would not pay for apixaban). Counseling points included the followin) Xarelto's indication, patient's need for the medication, and dosing/frequency of Xarelto.  2) Enforced the importance of taking Xarelto as instructed every day, as well as instructed patient to take Xarelto with food as this will increase bioavailability of the drug by 40%.  3) Explained possible side effects of Xarelto therapy, including increased risk of bleeding, s/sx of bleeding, and increase in cold intolerance. Also talked about ways to control bleeding for minor cuts and scrapes.  4) Emphasized the importance of going to the emergency room if any of the following occur: Falling and hitting your head; noticing bright red blood in urine or dark/tarry stools; vomiting up blood or vomit has a coffee-ground like texture; coughing up blood.  5) Discussed the importance of informing any physician or dentist that they have been started on Xarelto, in case they need to be taken off for a procedure.  6) Discussed all important drug interactions, including over-the-counter medications and supplements.  7) Instructed the patient not to begin or discontinue any medications without informing his/her physician/pharmacist.     I also talked about his other medications, including his new prescription for metoprolol, including dose, indication, side effects, and frequency.     Patient expressed understanding and had no further questions.       Genny Flores, Pharm.D., [facility]  Clinical Staff Pharmacist   Phone Extension #[phone]

## 2021-08-05 ENCOUNTER — OFFICE VISIT (OUTPATIENT)
Dept: CARDIOLOGY | Facility: CLINIC | Age: 56
End: 2021-08-05

## 2021-08-05 ENCOUNTER — TRANSCRIBE ORDERS (OUTPATIENT)
Dept: CARDIOLOGY | Facility: CLINIC | Age: 56
End: 2021-08-05

## 2021-08-05 VITALS
HEIGHT: 70 IN | HEART RATE: 80 BPM | WEIGHT: 172 LBS | BODY MASS INDEX: 24.62 KG/M2 | DIASTOLIC BLOOD PRESSURE: 86 MMHG | SYSTOLIC BLOOD PRESSURE: 132 MMHG

## 2021-08-05 DIAGNOSIS — Z01.818 OTHER SPECIFIED PRE-OPERATIVE EXAMINATION: Primary | ICD-10-CM

## 2021-08-05 DIAGNOSIS — I48.0 PAF (PAROXYSMAL ATRIAL FIBRILLATION) (HCC): Primary | Chronic | ICD-10-CM

## 2021-08-05 PROCEDURE — 93000 ELECTROCARDIOGRAM COMPLETE: CPT | Performed by: INTERNAL MEDICINE

## 2021-08-05 PROCEDURE — 99214 OFFICE O/P EST MOD 30 MIN: CPT | Performed by: INTERNAL MEDICINE

## 2021-08-05 NOTE — H&P (VIEW-ONLY)
Date of Office Visit: 2021  Encounter Provider: Bryn Finch MD  Place of Service: [facility] CARDIOLOGY  Patient Name: Domo Fairchild  :1965    Chief Complaint   Patient presents with   • Atrial Fibrillation     1 mnth follow up   :     HPI: Domo Fairchild is a 56 y.o. male who presents today for paroxysmal atrial fibrillation.     He has had paroxsymal atrial fibrillation for over 10 years.  He had an ablation in .  He has several episodes a year with palpitations and fatigue.      His last episode resulted in hospital admission.           Past Medical History:   Diagnosis Date   • Atrial fibrillation (CMS/HCC)    • Depression    • PAF (paroxysmal atrial fibrillation) (CMS/HCC)     Underwent an ablation procedure in .   • SOB (shortness of breath)    • Tachycardia        Past Surgical History:   Procedure Laterality Date   • APPENDECTOMY  1978   • C[facility]IAC [facility]BLATION  2010    [facility]   • HERNIA REPAIR Left 2006    Inguinal hernia, Dr. Coleman Hatch   • HERNIA REPAIR Right 2014    Inguinal hernia, Dr. Coleman Hatch   • INGUINAL HERNIA REPAIR  2007    Coleman Cruz   • VASECTOMY  1997       Social History     Socioeconomic History   • Marital status: Single     Spouse name: Not on file   • Number of children: Not on file   • Years of education: College   • Highest education level: Not on file   Tobacco Use   • Smoking status: Current Every Day Smoker     Packs/day: 1.00     Years: 30.00     Pack years: 30.00     Types: Cigarettes   • Smokeless tobacco: Never Used   Vaping Use   • Vaping Use: Never used   Substance and Sexual Activity   • Alcohol use: Yes     Alcohol/week: 2.0 standard drinks     Types: 2 Shots of liquor per week     Comment: 1-2 beers   • Drug use: No       Family History   Adopted: Yes       Review of Systems   Constitutional: Negative.   Cardiovascular: Positive for palpitations.   Respiratory:  "Negative.    Gastrointestinal: Negative.        Allergies   Allergen Reactions   • Penicillins Shortness Of Breath and Swelling         Current Outpatient Medications:   •  CIALIS 5 MG tablet, TAKE 1 TABLET BY MOUTH DAILY, Disp: , Rfl: 5  •  metoprolol tartrate (LOPRESSOR) 25 MG tablet, Take 1 tablet by mouth Every 12 (Twelve) Hours., Disp: 60 tablet, Rfl: 5  •  rivaroxaban (Xarelto) 20 MG tablet, Take 1 tablet by mouth Daily., Disp: 30 tablet, Rfl: 6  •  sertraline (ZOLOFT) 50 MG tablet, Take 50 mg by mouth Daily. Just started this medication yesterday., Disp: , Rfl:       Objective:     Vitals:    08/05/21 1145   BP: 132/86   Pulse: 80   Weight: 78 kg (172 lb)   Height: 177.8 cm (70\")     Body mass index is 24.68 kg/m².    PHYSICAL EXAM:    Vitals and nursing note reviewed.   Constitutional:       Appearance: Healthy appearance.   HENT:      Head: Normocephalic and atraumatic.   Pulmonary:      Effort: Pulmonary effort is normal.   Cardiovascular:      PMI at left midclavicular line. Regular rhythm.   Edema:     Peripheral edema absent.   Skin:     General: Skin is warm.   Neurological:      Mental Status: Alert, oriented to person, place, and time and oriented to person, place and time.   Psychiatric:         Attention and Perception: Attention and perception normal.         Mood and Affect: Mood and affect normal.             ECG 12 Lead    Date/Time: 8/5/2021 12:39 PM  Performed by: Bryn Finch MD  Authorized by: Bryn Finch MD   Comparison: compared with previous ECG from 7/1/2021  Rhythm: sinus rhythm        I reviewed his 7/1/2021 stress test.  It showed normal perfusion.    I removed his 6/30/2021 comprehensive metabolic panel.  It was normal.      Assessment:       Diagnosis Plan   1. PAF (paroxysmal atrial fibrillation) (CMS/[facility])            Plan:       The patient has recurrent paroxysmal atrial fibrillation following previous ablation.  Symptoms are frequent, and cause him quite a bit " of distress, resulted in hospital admission.  We discussed options for treatment, and I think the best option for him is repeat pulmonary vein isolation.  We discussed the risk, of which she was aware of including the risk of stroke, cardiac tamponade, and death.  He wished to proceed.  I asked him to continue on Xarelto, and we will schedule for radiofrequency ablation with cardio.  Penta ray catheter for mapping.    As always, it has been a pleasure to participate in your patient's care.      Sincerely,         Bryn Finch MD

## 2021-08-05 NOTE — PROGRESS NOTES
Date of Office Visit: 2021  Encounter Provider: Bryn Finch MD  Place of Service: [facility] CARDIOLOGY  Patient Name: Domo Fairchild  :1965    Chief Complaint   Patient presents with   • Atrial Fibrillation     1 mnth follow up   :     HPI: Domo Fairchild is a 56 y.o. male who presents today for paroxysmal atrial fibrillation.     He has had paroxsymal atrial fibrillation for over 10 years.  He had an ablation in .  He has several episodes a year with palpitations and fatigue.      His last episode resulted in hospital admission.           Past Medical History:   Diagnosis Date   • Atrial fibrillation (CMS/HCC)    • Depression    • PAF (paroxysmal atrial fibrillation) (CMS/HCC)     Underwent an ablation procedure in .   • SOB (shortness of breath)    • Tachycardia        Past Surgical History:   Procedure Laterality Date   • APPENDECTOMY  1978   • CARDIAC ABLATION  2010    [facility]   • HERNIA REPAIR Left 2006    Inguinal hernia, Dr. Coleman Hatch   • HERNIA REPAIR Right 2014    Inguinal hernia, Dr. Coleman Hatch   • INGUINAL HERNIA REPAIR  2007    Coleman Cruz   • VASECTOMY  1997       Social History     Socioeconomic History   • Marital status: Single     Spouse name: Not on file   • Number of children: Not on file   • Years of education: College   • Highest education level: Not on file   Tobacco Use   • Smoking status: Current Every Day Smoker     Packs/day: 1.00     Years: 30.00     Pack years: 30.00     Types: Cigarettes   • Smokeless tobacco: Never Used   Vaping Use   • Vaping Use: Never used   Substance and Sexual Activity   • Alcohol use: Yes     Alcohol/week: 2.0 standard drinks     Types: 2 Shots of liquor per week     Comment: 1-2 beers   • Drug use: No       Family History   Adopted: Yes       Review of Systems   Constitutional: Negative.   Cardiovascular: Positive for palpitations.   Respiratory:  "Negative.    Gastrointestinal: Negative.        Allergies   Allergen Reactions   • Penicillins Shortness Of Breath and Swelling         Current Outpatient Medications:   •  CIALIS 5 MG tablet, TAKE 1 TABLET BY MOUTH DAILY, Disp: , Rfl: 5  •  metoprolol tartrate (LOPRESSOR) 25 MG tablet, Take 1 tablet by mouth Every 12 (Twelve) Hours., Disp: 60 tablet, Rfl: 5  •  rivaroxaban (Xarelto) 20 MG tablet, Take 1 tablet by mouth Daily., Disp: 30 tablet, Rfl: 6  •  sertraline (ZOLOFT) 50 MG tablet, Take 50 mg by mouth Daily. Just started this medication yesterday., Disp: , Rfl:       Objective:     Vitals:    08/05/21 1145   BP: 132/86   Pulse: 80   Weight: 78 kg (172 lb)   Height: 177.8 cm (70\")     Body mass index is 24.68 kg/m².    PHYSICAL EXAM:    Vitals and nursing note reviewed.   Constitutional:       Appearance: Healthy appearance.   HENT:      Head: Normocephalic and atraumatic.   Pulmonary:      Effort: Pulmonary effort is normal.   Cardiovascular:      PMI at left midclavicular line. Regular rhythm.   Edema:     Peripheral edema absent.   Skin:     General: Skin is warm.   Neurological:      Mental Status: Alert, oriented to person, place, and time and oriented to person, place and time.   Psychiatric:         Attention and Perception: Attention and perception normal.         Mood and Affect: Mood and affect normal.             ECG 12 Lead    Date/Time: 8/5/2021 12:39 PM  Performed by: Bryn Finch MD  Authorized by: Bryn Finch MD   Comparison: compared with previous ECG from 7/1/2021  Rhythm: sinus rhythm        I reviewed his 7/1/2021 stress test.  It showed normal perfusion.    I removed his 6/30/2021 comprehensive metabolic panel.  It was normal.      Assessment:       Diagnosis Plan   1. PAF (paroxysmal atrial fibrillation) (CMS/[facility])            Plan:       The patient has recurrent paroxysmal atrial fibrillation following previous ablation.  Symptoms are frequent, and cause him quite a bit " of distress, resulted in hospital admission.  We discussed options for treatment, and I think the best option for him is repeat pulmonary vein isolation.  We discussed the risk, of which she was aware of including the risk of stroke, cardiac tamponade, and death.  He wished to proceed.  I asked him to continue on Xarelto, and we will schedule for radiofrequency ablation with cardio.  Penta ray catheter for mapping.    As always, it has been a pleasure to participate in your patient's care.      Sincerely,         Bryn Finch MD

## 2021-08-09 ENCOUNTER — TRANSCRIBE ORDERS (OUTPATIENT)
Dept: CARDIOLOGY | Facility: CLINIC | Age: 56
End: 2021-08-09

## 2021-08-09 DIAGNOSIS — Z13.6 SCREENING FOR CARDIOVASCULAR CONDITION: Primary | ICD-10-CM

## 2021-08-09 DIAGNOSIS — Z01.810 PRE-OPERATIVE CARDIOVASCULAR EXAMINATION: ICD-10-CM

## 2021-08-13 ENCOUNTER — LAB (OUTPATIENT)
Dept: LAB | Facility: HOSPITAL | Age: 56
End: 2021-08-13

## 2021-08-13 DIAGNOSIS — Z01.818 OTHER SPECIFIED PRE-OPERATIVE EXAMINATION: ICD-10-CM

## 2021-08-13 DIAGNOSIS — Z01.810 PRE-OPERATIVE CARDIOVASCULAR EXAMINATION: ICD-10-CM

## 2021-08-13 DIAGNOSIS — Z13.6 SCREENING FOR CARDIOVASCULAR CONDITION: ICD-10-CM

## 2021-08-13 LAB
ANION GAP SERPL CALCULATED.3IONS-SCNC: 8.7 MMOL/L (ref 5–15)
BASOPHILS # BLD AUTO: 0.06 10*3/MM3 (ref 0–0.2)
BASOPHILS NFR BLD AUTO: 0.9 % (ref 0–1.5)
BUN SERPL-MCNC: 13 MG/DL (ref 6–20)
BUN/CREAT SERPL: 12.1 (ref 7–25)
CALCIUM SPEC-SCNC: 8.9 MG/DL (ref 8.6–10.5)
CHLORIDE SERPL-SCNC: 107 MMOL/L (ref 98–107)
CO2 SERPL-SCNC: 25.3 MMOL/L (ref 22–29)
CREAT SERPL-MCNC: 1.07 MG/DL (ref 0.76–1.27)
DEPRECATED RDW RBC AUTO: 44.6 FL (ref 37–54)
EOSINOPHIL # BLD AUTO: 0.24 10*3/MM3 (ref 0–0.4)
EOSINOPHIL NFR BLD AUTO: 3.4 % (ref 0.3–6.2)
ERYTHROCYTE [DISTWIDTH] IN BLOOD BY AUTOMATED COUNT: 13.1 % (ref 12.3–15.4)
GFR SERPL CREATININE-BSD FRML MDRD: 72 ML/MIN/1.73
GLUCOSE SERPL-MCNC: 101 MG/DL (ref 65–99)
HCT VFR BLD AUTO: 43.1 % (ref 37.5–51)
HGB BLD-MCNC: 14.5 G/DL (ref 13–17.7)
IMM GRANULOCYTES # BLD AUTO: 0.01 10*3/MM3 (ref 0–0.05)
IMM GRANULOCYTES NFR BLD AUTO: 0.1 % (ref 0–0.5)
LYMPHOCYTES # BLD AUTO: 2.7 10*3/MM3 (ref 0.7–3.1)
LYMPHOCYTES NFR BLD AUTO: 38.4 % (ref 19.6–45.3)
MCH RBC QN AUTO: 31.6 PG (ref 26.6–33)
MCHC RBC AUTO-ENTMCNC: 33.6 G/DL (ref 31.5–35.7)
MCV RBC AUTO: 93.9 FL (ref 79–97)
MONOCYTES # BLD AUTO: 0.41 10*3/MM3 (ref 0.1–0.9)
MONOCYTES NFR BLD AUTO: 5.8 % (ref 5–12)
NEUTROPHILS NFR BLD AUTO: 3.61 10*3/MM3 (ref 1.7–7)
NEUTROPHILS NFR BLD AUTO: 51.4 % (ref 42.7–76)
NRBC BLD AUTO-RTO: 0 /100 WBC (ref 0–0.2)
PLATELET # BLD AUTO: 271 10*3/MM3 (ref 140–450)
PMV BLD AUTO: 10.9 FL (ref 6–12)
POTASSIUM SERPL-SCNC: 4.4 MMOL/L (ref 3.5–5.2)
RBC # BLD AUTO: 4.59 10*6/MM3 (ref 4.14–5.8)
SARS-COV-2 ORF1AB RESP QL NAA+PROBE: NOT DETECTED
SODIUM SERPL-SCNC: 141 MMOL/L (ref 136–145)
WBC # BLD AUTO: 7.03 10*3/MM3 (ref 3.4–10.8)

## 2021-08-13 PROCEDURE — 80048 BASIC METABOLIC PNL TOTAL CA: CPT

## 2021-08-13 PROCEDURE — 85025 COMPLETE CBC W/AUTO DIFF WBC: CPT

## 2021-08-13 PROCEDURE — C9803 HOPD COVID-19 SPEC COLLECT: HCPCS

## 2021-08-13 PROCEDURE — U0004 COV-19 TEST NON-CDC HGH THRU: HCPCS

## 2021-08-13 PROCEDURE — 36415 COLL VENOUS BLD VENIPUNCTURE: CPT

## 2021-08-16 ENCOUNTER — ANESTHESIA EVENT (OUTPATIENT)
Dept: CARDIOLOGY | Facility: HOSPITAL | Age: 56
End: 2021-08-16

## 2021-08-16 ENCOUNTER — HOSPITAL ENCOUNTER (OUTPATIENT)
Facility: HOSPITAL | Age: 56
Setting detail: HOSPITAL OUTPATIENT SURGERY
Discharge: HOME | End: 2021-08-16
Attending: INTERNAL MEDICINE | Admitting: INTERNAL MEDICINE

## 2021-08-16 ENCOUNTER — ANESTHESIA (OUTPATIENT)
Dept: CARDIOLOGY | Facility: HOSPITAL | Age: 56
End: 2021-08-16

## 2021-08-16 VITALS
RESPIRATION RATE: 16 BRPM | HEART RATE: 77 BPM | OXYGEN SATURATION: 96 % | BODY MASS INDEX: 24.34 KG/M2 | WEIGHT: 170 LBS | SYSTOLIC BLOOD PRESSURE: 122 MMHG | TEMPERATURE: 97.3 F | DIASTOLIC BLOOD PRESSURE: 77 MMHG | HEIGHT: 70 IN

## 2021-08-16 DIAGNOSIS — I48.0 PAF (PAROXYSMAL ATRIAL FIBRILLATION) (HCC): ICD-10-CM

## 2021-08-16 LAB
ACT BLD: 290 SECONDS (ref 82–152)
ACT BLD: 323 SECONDS (ref 82–152)
ACT BLD: 351 SECONDS (ref 82–152)
ACT BLD: 367 SECONDS (ref 82–152)
QT INTERVAL: 406 MS
QT INTERVAL: 420 MS

## 2021-08-16 PROCEDURE — 93005 ELECTROCARDIOGRAM TRACING: CPT | Performed by: INTERNAL MEDICINE

## 2021-08-16 PROCEDURE — 93662 INTRACARDIAC ECG (ICE): CPT | Performed by: INTERNAL MEDICINE

## 2021-08-16 PROCEDURE — 25010000002 HEPARIN (PORCINE) PER 1000 UNITS: Performed by: INTERNAL MEDICINE

## 2021-08-16 PROCEDURE — C1766 INTRO/SHEATH,STRBLE,NON-PEEL: HCPCS | Performed by: INTERNAL MEDICINE

## 2021-08-16 PROCEDURE — 93656 COMPRE EP EVAL ABLTJ ATR FIB: CPT | Performed by: INTERNAL MEDICINE

## 2021-08-16 PROCEDURE — C1732 CATH, EP, DIAG/ABL, 3D/VECT: HCPCS | Performed by: INTERNAL MEDICINE

## 2021-08-16 PROCEDURE — 25010000002 PROPOFOL 10 MG/ML EMULSION: Performed by: ANESTHESIOLOGY

## 2021-08-16 PROCEDURE — 25010000002 FENTANYL CITRATE (PF) 50 MCG/ML SOLUTION: Performed by: ANESTHESIOLOGY

## 2021-08-16 PROCEDURE — C1893 INTRO/SHEATH, FIXED,NON-PEEL: HCPCS | Performed by: INTERNAL MEDICINE

## 2021-08-16 PROCEDURE — 85347 COAGULATION TIME ACTIVATED: CPT

## 2021-08-16 PROCEDURE — 93613 INTRACARDIAC EPHYS 3D MAPG: CPT | Performed by: INTERNAL MEDICINE

## 2021-08-16 PROCEDURE — 25010000002 SUCCINYLCHOLINE PER 20 MG: Performed by: ANESTHESIOLOGY

## 2021-08-16 PROCEDURE — 25010000002 PHENYLEPHRINE PER 1 ML: Performed by: ANESTHESIOLOGY

## 2021-08-16 PROCEDURE — C1760 CLOSURE DEV, VASC: HCPCS | Performed by: INTERNAL MEDICINE

## 2021-08-16 PROCEDURE — C1730 CATH, EP, 19 OR FEW ELECT: HCPCS | Performed by: INTERNAL MEDICINE

## 2021-08-16 PROCEDURE — C1759 CATH, INTRA ECHOCARDIOGRAPHY: HCPCS | Performed by: INTERNAL MEDICINE

## 2021-08-16 PROCEDURE — 25010000002 MIDAZOLAM PER 1 MG: Performed by: ANESTHESIOLOGY

## 2021-08-16 PROCEDURE — C1894 INTRO/SHEATH, NON-LASER: HCPCS | Performed by: INTERNAL MEDICINE

## 2021-08-16 PROCEDURE — 93010 ELECTROCARDIOGRAM REPORT: CPT | Performed by: INTERNAL MEDICINE

## 2021-08-16 PROCEDURE — 25010000002 PROTAMINE SULFATE PER 10 MG: Performed by: INTERNAL MEDICINE

## 2021-08-16 RX ORDER — ACETAMINOPHEN 650 MG/1
650 SUPPOSITORY RECTAL EVERY 4 HOURS PRN
Status: DISCONTINUED | OUTPATIENT
Start: 2021-08-16 | End: 2021-08-16 | Stop reason: HOSPADM

## 2021-08-16 RX ORDER — LABETALOL HYDROCHLORIDE 5 MG/ML
5 INJECTION, SOLUTION INTRAVENOUS
Status: DISCONTINUED | OUTPATIENT
Start: 2021-08-16 | End: 2021-08-16 | Stop reason: HOSPADM

## 2021-08-16 RX ORDER — HEPARIN SODIUM 1000 [USP'U]/ML
INJECTION, SOLUTION INTRAVENOUS; SUBCUTANEOUS AS NEEDED
Status: DISCONTINUED | OUTPATIENT
Start: 2021-08-16 | End: 2021-08-16 | Stop reason: HOSPADM

## 2021-08-16 RX ORDER — ACETAMINOPHEN 325 MG/1
650 TABLET ORAL EVERY 4 HOURS PRN
Status: DISCONTINUED | OUTPATIENT
Start: 2021-08-16 | End: 2021-08-16 | Stop reason: HOSPADM

## 2021-08-16 RX ORDER — SODIUM CHLORIDE, SODIUM LACTATE, POTASSIUM CHLORIDE, CALCIUM CHLORIDE 600; 310; 30; 20 MG/100ML; MG/100ML; MG/100ML; MG/100ML
INJECTION, SOLUTION INTRAVENOUS CONTINUOUS PRN
Status: DISCONTINUED | OUTPATIENT
Start: 2021-08-16 | End: 2021-08-16 | Stop reason: SURG

## 2021-08-16 RX ORDER — FENTANYL CITRATE 50 UG/ML
50 INJECTION, SOLUTION INTRAMUSCULAR; INTRAVENOUS
Status: CANCELLED | OUTPATIENT
Start: 2021-08-16

## 2021-08-16 RX ORDER — HEPARIN SODIUM 10000 [USP'U]/100ML
INJECTION, SOLUTION INTRAVENOUS CONTINUOUS PRN
Status: COMPLETED | OUTPATIENT
Start: 2021-08-16 | End: 2021-08-16

## 2021-08-16 RX ORDER — LIDOCAINE HYDROCHLORIDE 10 MG/ML
0.5 INJECTION, SOLUTION EPIDURAL; INFILTRATION; INTRACAUDAL; PERINEURAL ONCE AS NEEDED
Status: CANCELLED | OUTPATIENT
Start: 2021-08-16

## 2021-08-16 RX ORDER — PROPOFOL 10 MG/ML
VIAL (ML) INTRAVENOUS AS NEEDED
Status: DISCONTINUED | OUTPATIENT
Start: 2021-08-16 | End: 2021-08-16 | Stop reason: SURG

## 2021-08-16 RX ORDER — SODIUM CHLORIDE, SODIUM LACTATE, POTASSIUM CHLORIDE, CALCIUM CHLORIDE 600; 310; 30; 20 MG/100ML; MG/100ML; MG/100ML; MG/100ML
9 INJECTION, SOLUTION INTRAVENOUS CONTINUOUS
Status: CANCELLED | OUTPATIENT
Start: 2021-08-16

## 2021-08-16 RX ORDER — IBUPROFEN 600 MG/1
600 TABLET ORAL ONCE AS NEEDED
Status: DISCONTINUED | OUTPATIENT
Start: 2021-08-16 | End: 2021-08-16 | Stop reason: HOSPADM

## 2021-08-16 RX ORDER — EPHEDRINE SULFATE 50 MG/ML
INJECTION, SOLUTION INTRAVENOUS AS NEEDED
Status: DISCONTINUED | OUTPATIENT
Start: 2021-08-16 | End: 2021-08-16 | Stop reason: SURG

## 2021-08-16 RX ORDER — SODIUM CHLORIDE 0.9 % (FLUSH) 0.9 %
3 SYRINGE (ML) INJECTION EVERY 12 HOURS SCHEDULED
Status: CANCELLED | OUTPATIENT
Start: 2021-08-16

## 2021-08-16 RX ORDER — NALOXONE HCL 0.4 MG/ML
0.4 VIAL (ML) INJECTION
Status: DISCONTINUED | OUTPATIENT
Start: 2021-08-16 | End: 2021-08-16 | Stop reason: HOSPADM

## 2021-08-16 RX ORDER — LIDOCAINE HYDROCHLORIDE 20 MG/ML
INJECTION, SOLUTION INFILTRATION; PERINEURAL AS NEEDED
Status: DISCONTINUED | OUTPATIENT
Start: 2021-08-16 | End: 2021-08-16 | Stop reason: SURG

## 2021-08-16 RX ORDER — PROMETHAZINE HYDROCHLORIDE 12.5 MG/1
25 TABLET ORAL ONCE AS NEEDED
Status: DISCONTINUED | OUTPATIENT
Start: 2021-08-16 | End: 2021-08-16 | Stop reason: HOSPADM

## 2021-08-16 RX ORDER — ONDANSETRON 2 MG/ML
4 INJECTION INTRAMUSCULAR; INTRAVENOUS ONCE AS NEEDED
Status: DISCONTINUED | OUTPATIENT
Start: 2021-08-16 | End: 2021-08-16 | Stop reason: HOSPADM

## 2021-08-16 RX ORDER — MIDAZOLAM HYDROCHLORIDE 1 MG/ML
1 INJECTION INTRAMUSCULAR; INTRAVENOUS
Status: DISCONTINUED | OUTPATIENT
Start: 2021-08-16 | End: 2021-08-16 | Stop reason: HOSPADM

## 2021-08-16 RX ORDER — SODIUM CHLORIDE 9 MG/ML
75 INJECTION, SOLUTION INTRAVENOUS CONTINUOUS
Status: DISCONTINUED | OUTPATIENT
Start: 2021-08-16 | End: 2021-08-16 | Stop reason: HOSPADM

## 2021-08-16 RX ORDER — OXYCODONE AND ACETAMINOPHEN 10; 325 MG/1; MG/1
1 TABLET ORAL EVERY 4 HOURS PRN
Status: DISCONTINUED | OUTPATIENT
Start: 2021-08-16 | End: 2021-08-16 | Stop reason: HOSPADM

## 2021-08-16 RX ORDER — HYDRALAZINE HYDROCHLORIDE 20 MG/ML
5 INJECTION INTRAMUSCULAR; INTRAVENOUS
Status: DISCONTINUED | OUTPATIENT
Start: 2021-08-16 | End: 2021-08-16 | Stop reason: HOSPADM

## 2021-08-16 RX ORDER — FAMOTIDINE 10 MG/ML
20 INJECTION, SOLUTION INTRAVENOUS ONCE
Status: COMPLETED | OUTPATIENT
Start: 2021-08-16 | End: 2021-08-16

## 2021-08-16 RX ORDER — HYDROCODONE BITARTRATE AND ACETAMINOPHEN 7.5; 325 MG/1; MG/1
1 TABLET ORAL ONCE AS NEEDED
Status: COMPLETED | OUTPATIENT
Start: 2021-08-16 | End: 2021-08-16

## 2021-08-16 RX ORDER — FENTANYL CITRATE 50 UG/ML
50 INJECTION, SOLUTION INTRAMUSCULAR; INTRAVENOUS
Status: DISCONTINUED | OUTPATIENT
Start: 2021-08-16 | End: 2021-08-16 | Stop reason: HOSPADM

## 2021-08-16 RX ORDER — FENTANYL CITRATE 50 UG/ML
INJECTION, SOLUTION INTRAMUSCULAR; INTRAVENOUS AS NEEDED
Status: DISCONTINUED | OUTPATIENT
Start: 2021-08-16 | End: 2021-08-16 | Stop reason: SURG

## 2021-08-16 RX ORDER — ROCURONIUM BROMIDE 10 MG/ML
INJECTION, SOLUTION INTRAVENOUS AS NEEDED
Status: DISCONTINUED | OUTPATIENT
Start: 2021-08-16 | End: 2021-08-16 | Stop reason: SURG

## 2021-08-16 RX ORDER — FLUMAZENIL 0.1 MG/ML
0.2 INJECTION INTRAVENOUS AS NEEDED
Status: DISCONTINUED | OUTPATIENT
Start: 2021-08-16 | End: 2021-08-16 | Stop reason: HOSPADM

## 2021-08-16 RX ORDER — NALOXONE HCL 0.4 MG/ML
0.2 VIAL (ML) INJECTION AS NEEDED
Status: DISCONTINUED | OUTPATIENT
Start: 2021-08-16 | End: 2021-08-16 | Stop reason: HOSPADM

## 2021-08-16 RX ORDER — PROMETHAZINE HYDROCHLORIDE 25 MG/1
25 SUPPOSITORY RECTAL ONCE AS NEEDED
Status: DISCONTINUED | OUTPATIENT
Start: 2021-08-16 | End: 2021-08-16 | Stop reason: HOSPADM

## 2021-08-16 RX ORDER — HYDROMORPHONE HYDROCHLORIDE 1 MG/ML
0.5 INJECTION, SOLUTION INTRAMUSCULAR; INTRAVENOUS; SUBCUTANEOUS
Status: DISCONTINUED | OUTPATIENT
Start: 2021-08-16 | End: 2021-08-16 | Stop reason: HOSPADM

## 2021-08-16 RX ORDER — SODIUM CHLORIDE 0.9 % (FLUSH) 0.9 %
3 SYRINGE (ML) INJECTION EVERY 12 HOURS SCHEDULED
Status: DISCONTINUED | OUTPATIENT
Start: 2021-08-16 | End: 2021-08-16 | Stop reason: HOSPADM

## 2021-08-16 RX ORDER — DIPHENHYDRAMINE HCL 25 MG
25 CAPSULE ORAL
Status: DISCONTINUED | OUTPATIENT
Start: 2021-08-16 | End: 2021-08-16 | Stop reason: HOSPADM

## 2021-08-16 RX ORDER — SUCCINYLCHOLINE CHLORIDE 20 MG/ML
INJECTION INTRAMUSCULAR; INTRAVENOUS AS NEEDED
Status: DISCONTINUED | OUTPATIENT
Start: 2021-08-16 | End: 2021-08-16 | Stop reason: SURG

## 2021-08-16 RX ORDER — DIPHENHYDRAMINE HYDROCHLORIDE 50 MG/ML
12.5 INJECTION INTRAMUSCULAR; INTRAVENOUS
Status: DISCONTINUED | OUTPATIENT
Start: 2021-08-16 | End: 2021-08-16 | Stop reason: HOSPADM

## 2021-08-16 RX ORDER — PROTAMINE SULFATE 10 MG/ML
INJECTION, SOLUTION INTRAVENOUS AS NEEDED
Status: DISCONTINUED | OUTPATIENT
Start: 2021-08-16 | End: 2021-08-16 | Stop reason: HOSPADM

## 2021-08-16 RX ORDER — EPHEDRINE SULFATE 50 MG/ML
5 INJECTION, SOLUTION INTRAVENOUS ONCE AS NEEDED
Status: DISCONTINUED | OUTPATIENT
Start: 2021-08-16 | End: 2021-08-16 | Stop reason: HOSPADM

## 2021-08-16 RX ORDER — SODIUM CHLORIDE 0.9 % (FLUSH) 0.9 %
10 SYRINGE (ML) INJECTION AS NEEDED
Status: DISCONTINUED | OUTPATIENT
Start: 2021-08-16 | End: 2021-08-16 | Stop reason: HOSPADM

## 2021-08-16 RX ORDER — LIDOCAINE HYDROCHLORIDE AND EPINEPHRINE 10; 10 MG/ML; UG/ML
INJECTION, SOLUTION INFILTRATION; PERINEURAL AS NEEDED
Status: DISCONTINUED | OUTPATIENT
Start: 2021-08-16 | End: 2021-08-16 | Stop reason: HOSPADM

## 2021-08-16 RX ORDER — SODIUM CHLORIDE 0.9 % (FLUSH) 0.9 %
3-10 SYRINGE (ML) INJECTION AS NEEDED
Status: CANCELLED | OUTPATIENT
Start: 2021-08-16

## 2021-08-16 RX ADMIN — PHENYLEPHRINE HYDROCHLORIDE 300 MCG: 10 INJECTION INTRAVENOUS at 10:01

## 2021-08-16 RX ADMIN — LIDOCAINE HYDROCHLORIDE 100 MG: 20 INJECTION, SOLUTION INFILTRATION; PERINEURAL at 08:33

## 2021-08-16 RX ADMIN — ROCURONIUM BROMIDE 5 MG: 50 INJECTION INTRAVENOUS at 08:32

## 2021-08-16 RX ADMIN — PROPOFOL 200 MG: 10 INJECTION, EMULSION INTRAVENOUS at 08:33

## 2021-08-16 RX ADMIN — FENTANYL CITRATE 100 MCG: 50 INJECTION INTRAMUSCULAR; INTRAVENOUS at 08:51

## 2021-08-16 RX ADMIN — ROCURONIUM BROMIDE 45 MG: 50 INJECTION INTRAVENOUS at 08:45

## 2021-08-16 RX ADMIN — SUCCINYLCHOLINE CHLORIDE 200 MG: 20 INJECTION, SOLUTION INTRAMUSCULAR; INTRAVENOUS; PARENTERAL at 08:33

## 2021-08-16 RX ADMIN — SODIUM CHLORIDE, POTASSIUM CHLORIDE, SODIUM LACTATE AND CALCIUM CHLORIDE: 600; 310; 30; 20 INJECTION, SOLUTION INTRAVENOUS at 08:35

## 2021-08-16 RX ADMIN — SUGAMMADEX 200 MG: 100 INJECTION, SOLUTION INTRAVENOUS at 11:05

## 2021-08-16 RX ADMIN — FAMOTIDINE 20 MG: 10 INJECTION, SOLUTION INTRAVENOUS at 08:09

## 2021-08-16 RX ADMIN — PHENYLEPHRINE HYDROCHLORIDE 200 MCG: 10 INJECTION INTRAVENOUS at 09:09

## 2021-08-16 RX ADMIN — PHENYLEPHRINE HYDROCHLORIDE 200 MCG: 10 INJECTION INTRAVENOUS at 08:51

## 2021-08-16 RX ADMIN — PHENYLEPHRINE HYDROCHLORIDE 500 MCG: 10 INJECTION INTRAVENOUS at 10:45

## 2021-08-16 RX ADMIN — HYDROCODONE BITARTRATE AND ACETAMINOPHEN 1 TABLET: 7.5; 325 TABLET ORAL at 12:02

## 2021-08-16 RX ADMIN — PHENYLEPHRINE HYDROCHLORIDE 300 MCG: 10 INJECTION INTRAVENOUS at 10:51

## 2021-08-16 RX ADMIN — PHENYLEPHRINE HYDROCHLORIDE 200 MCG: 10 INJECTION INTRAVENOUS at 09:17

## 2021-08-16 RX ADMIN — PHENYLEPHRINE HYDROCHLORIDE 200 MCG: 10 INJECTION INTRAVENOUS at 08:56

## 2021-08-16 RX ADMIN — SODIUM CHLORIDE 75 ML/HR: 9 INJECTION, SOLUTION INTRAVENOUS at 07:06

## 2021-08-16 RX ADMIN — PHENYLEPHRINE HYDROCHLORIDE 200 MCG: 10 INJECTION INTRAVENOUS at 09:02

## 2021-08-16 RX ADMIN — PHENYLEPHRINE HYDROCHLORIDE 200 MCG: 10 INJECTION INTRAVENOUS at 08:47

## 2021-08-16 RX ADMIN — EPHEDRINE SULFATE 10 MG: 50 INJECTION INTRAVENOUS at 09:36

## 2021-08-16 RX ADMIN — MIDAZOLAM 1 MG: 1 INJECTION INTRAMUSCULAR; INTRAVENOUS at 08:14

## 2021-08-16 RX ADMIN — ROCURONIUM BROMIDE 50 MG: 50 INJECTION INTRAVENOUS at 10:15

## 2021-08-16 NOTE — DISCHARGE INSTRUCTIONS
Coronary Ablation After Care  Refer to this sheet in the next few weeks. These instructions provide you with information on caring for yourself after your procedure. Your health care provider may also give you more specific instructions. Your treatment has been planned according to current medical practices, but problems sometimes occur. Call your health care provider if you have any problems or questions after your procedure.      What to Expect After the Procedure:  After your procedure, it is typical to have the following sensations:  · Minor discomfort or tenderness and a small bump at the catheter insertion site(s). The bump(s) should usually decrease in size and tenderness within 1 to 2 weeks.  · Any bruising will usually fade within 2 to 4 weeks.  Home Care Instructions:  · Do not apply powder or lotion to the site.  · Do not take baths, swim, or use a hot tub until your health care provider approves and the site is completely healed.  · Do not bend, squat, or lift anything over 20 lb (9 kg) or as directed by your health care provider. However, we recommend lifting nothing heavier than a gallon of milk.    · You may shower 24 hours after the procedure. Remove the bandage (dressing) and gently wash the site with plain soap and water. Gently pat the site dry. You may apply a band aid daily for 2 days if desired.    · Inspect the site at least twice daily.  · Increase your fluid intake for the next 2 days.    · Limit your activity for the first 48 hours.   · Avoid strenuous activity for 1 week or as advised by your physician.    · Follow instructions about when you can drive or return to work as directed by your physician.    · Hold direct pressure over the site when you cough, sneeze, laugh or change positions.  Do this for the next 2 days.      Call Your Doctor If:  · You have drainage (other than a small amount of blood on the dressing).  · You have chills or a fever > 101.  · You have redness, warmth,  swelling (larger than a walnut), or pain at the insertion   · You develop palpitations, chest pain or shortness of breath, feel faint, or pass out.  · You develop pain, discoloration, coldness, numbness, tingling, or severe bruising in the leg that held the catheter.  · You develop bleeding from any other place, such as the bowels.  · You have heavy bleeding from the site.  If this happens, hold pressure on the site and call 911.      Make Sure You:  · Understand these instructions.  · Will watch your condition.  · Will get help right away if you are not doing well or get worse.

## 2021-08-16 NOTE — ANESTHESIA POSTPROCEDURE EVALUATION
Patient: Domo Fairchild    Procedure Summary     Date: 08/16/21 Room / Location: [facility]  /  [facility]    Anesthesia Start: 0829 Anesthesia Stop: 1132    Procedures:       Ablation atrial fibrillation (N/A )      3D MAPPING CARTO EP (N/A ) Diagnosis:       PAF (paroxysmal atrial fibrillation) (CMS/HCC)      (atrial fibrillation)    Providers: Bryn Finch MD Provider: Larry Salvador MD    Anesthesia Type: general ASA Status: 3          Anesthesia Type: general    Vitals  Vitals Value Taken Time   /77 08/16/21 1326   Temp     Pulse 80 08/16/21 1405   Resp 16 08/16/21 1220   SpO2 97 % 08/16/21 1317   Vitals shown include unvalidated device data.        Post Anesthesia Care and Evaluation    Patient location during evaluation: bedside  Patient participation: complete - patient participated  Level of consciousness: awake and alert  Pain management: adequate  Airway patency: patent  Anesthetic complications: No anesthetic complications  PONV Status: controlled  Cardiovascular status: acceptable  Respiratory status: acceptable  Hydration status: acceptable

## 2021-08-16 NOTE — ANESTHESIA PROCEDURE NOTES
Airway  Urgency: elective    Date/Time: 8/16/2021 8:38 AM  End Time:8/16/2021 8:40 AM  Airway not difficult    General Information and Staff    Patient location during procedure: OR  Anesthesiologist: Larry Salvador MD    Indications and Patient Condition  Indications for airway management: airway protection    Preoxygenated: yes  Mask difficulty assessment: 0 - not attempted    Final Airway Details  Final airway type: endotracheal airway      Successful airway: ETT  Cuffed: yes   Successful intubation technique: direct laryngoscopy  Facilitating devices/methods: Bougie  Endotracheal tube insertion site: oral  Blade: At[place]  Blade size: 2  ETT size (mm): 8.0  Cormack-Lehane Classification: grade IIa - partial view of glottis  Placement verified by: chest auscultation and capnometry   Number of attempts at approach: 2  Assessment: lips, teeth, and gum same as pre-op and atraumatic intubation

## 2021-08-16 NOTE — ANESTHESIA PREPROCEDURE EVALUATION
Anesthesia Evaluation     Patient summary reviewed and Nursing notes reviewed   NPO Solid Status: > 8 hours  NPO Liquid Status: > 4 hours           Airway   Mallampati: II  Neck ROM: full  No difficulty expected  Dental - normal exam     Pulmonary     breath sounds clear to auscultation  (+) a smoker Current, shortness of breath, sleep apnea,   Cardiovascular     Rhythm: regular    (+) dysrhythmias Atrial Fib,       Neuro/Psych  (+) psychiatric history Depression,     GI/Hepatic/Renal/Endo      Musculoskeletal     Abdominal    Substance History      OB/GYN          Other                        Anesthesia Plan    ASA 3     general     intravenous induction     Anesthetic plan, all risks, benefits, and alternatives have been provided, discussed and informed consent has been obtained with: patient.

## 2021-08-18 ENCOUNTER — TELEPHONE (OUTPATIENT)
Dept: CARDIOLOGY | Facility: CLINIC | Age: 56
End: 2021-08-18

## 2021-08-18 NOTE — TELEPHONE ENCOUNTER
Pt has a fib ablation 8-16. He called to schedule his 1 mnth follow up and to ask how long he has to be off work..April

## 2021-08-19 NOTE — TELEPHONE ENCOUNTER
Called and LVM for pt letting him know he could go back to work on Monday 8-23 and to call if he needed a letter faxed...April

## 2021-08-23 ENCOUNTER — TELEPHONE (OUTPATIENT)
Dept: CARDIOLOGY | Facility: CLINIC | Age: 56
End: 2021-08-23

## 2021-09-03 LAB
MAXIMAL PREDICTED HEART RATE: 166 BPM
STRESS TARGET HR: 141 BPM

## 2021-09-28 ENCOUNTER — OFFICE VISIT (OUTPATIENT)
Dept: CARDIOLOGY | Facility: CLINIC | Age: 56
End: 2021-09-28

## 2021-09-28 VITALS
WEIGHT: 174 LBS | SYSTOLIC BLOOD PRESSURE: 126 MMHG | HEIGHT: 70 IN | BODY MASS INDEX: 24.91 KG/M2 | HEART RATE: 87 BPM | DIASTOLIC BLOOD PRESSURE: 88 MMHG

## 2021-09-28 DIAGNOSIS — I48.0 PAF (PAROXYSMAL ATRIAL FIBRILLATION) (HCC): Primary | Chronic | ICD-10-CM

## 2021-09-28 PROCEDURE — 93000 ELECTROCARDIOGRAM COMPLETE: CPT | Performed by: INTERNAL MEDICINE

## 2021-09-28 PROCEDURE — 99213 OFFICE O/P EST LOW 20 MIN: CPT | Performed by: INTERNAL MEDICINE

## 2021-09-28 NOTE — PROGRESS NOTES
Date of Office Visit: 2021  Encounter Provider: Bryn Finch MD  Place of Service: [facility] CARDIOLOGY  Patient Name: Domo Fairchild  :1965    Chief Complaint   Patient presents with   • paroxysmal AFIB   • s/p ablation    • s/p cardioversion    :     HPI: Domo Fairchild is a 56 y.o. male who presents today for paroxysmal atrial fibrillation follow-up from ablation.    The patient is done well.  He has had expected bruising which has resolved.  He has not had any atrial fibrillation that he is noted.  He remains on Xarelto but is stopped his beta-blocker.          Past Medical History:   Diagnosis Date   • Atrial fibrillation (CMS/HCC)    • Depression    • PAF (paroxysmal atrial fibrillation) (CMS/HCC)     Underwent an ablation procedure in .   • SOB (shortness of breath)    • Tachycardia        Past Surgical History:   Procedure Laterality Date   • APPENDECTOMY  1978   • CARDIAC ABLATION  2010    [facility]   • CARDIAC ELECTROPHYSIOLOGY PROCEDURE N/A 2021    Procedure: Ablation atrial fibrillation;  Surgeon: Bryn Finch MD;  Location: [facility] INVASIVE LOCATION;  Service: Cardiovascular;  Laterality: N/A;   • HERNIA REPAIR Left 2006    Inguinal hernia, Dr. Coleman Hatch   • HERNIA REPAIR Right 2014    Inguinal hernia, Dr. Coleman Hatch   • INGUINAL HERNIA REPAIR  2007    [facility]man Cruz   • VASECTOMY  1997       Social History     Socioeconomic History   • Marital status: Single     Spouse name: Not on file   • Number of children: Not on file   • Years of education: College   • Highest education level: Not on file   Tobacco Use   • Smoking status: Current Every Day Smoker     Packs/day: 1.00     Years: 30.00     Pack years: 30.00     Types: Cigarettes   • Smokeless tobacco: Never Used   Vaping Use   • Vaping Use: Never used   Substance and Sexual Activity   • Alcohol use: Yes     Alcohol/week: 2.0  "standard drinks     Types: 2 Shots of liquor per week     Comment: 1-2 beers   • Drug use: No   • Sexual activity: Defer       Family History   Adopted: Yes       Review of Systems   Constitutional: Negative.   Cardiovascular: Negative.    Respiratory: Negative.    Gastrointestinal: Negative.        Allergies   Allergen Reactions   • Penicillins Shortness Of Breath and Swelling         Current Outpatient Medications:   •  rivaroxaban (Xarelto) 20 MG tablet, Take 1 tablet by mouth Daily., Disp: 30 tablet, Rfl: 6      Objective:     Vitals:    09/28/21 0949   BP: 126/88   Pulse: 87   Weight: 78.9 kg (174 lb)   Height: 177.8 cm (70\")     Body mass index is 24.97 kg/m².    PHYSICAL EXAM:    Vitals and nursing note reviewed.   Cardiovascular:      Normal rate. Regular rhythm.   Edema:     Peripheral edema absent.   Skin:     General: Skin is warm.   Neurological:      Mental Status: Alert and oriented to person, place and time.             ECG 12 Lead    Date/Time: 9/28/2021 10:39 AM  Performed by: Bryn Finch MD  Authorized by: Bryn Finch MD   Comparison: compared with previous ECG from 8/16/2021  Similar to previous ECG  Rhythm: sinus rhythm              Assessment:       Diagnosis Plan   1. PAF (paroxysmal atrial fibrillation) (CMS/[facility])            Plan:       Paroxysmal atrial fibrillation improved since ablation.  I will have him continue Xarelto to complete 90 days of therapy post ablation.  He will follow-up in 2 months.    As always, it has been a pleasure to participate in your patient's care.      Sincerely,         Bryn Finch MD  "

## 2021-10-27 ENCOUNTER — IMMUNIZATION (OUTPATIENT)
Dept: VACCINE CLINIC | Facility: HOSPITAL | Age: 56
End: 2021-10-27

## 2021-10-27 PROCEDURE — 91300 HC SARSCOV02 VAC 30MCG/0.3ML IM: CPT | Performed by: INTERNAL MEDICINE

## 2021-10-27 PROCEDURE — 0004A ADM SARSCOV2 30MCG/0.3ML BOOSTER: CPT | Performed by: INTERNAL MEDICINE

## 2021-12-03 ENCOUNTER — OFFICE VISIT (OUTPATIENT)
Dept: CARDIOLOGY | Facility: CLINIC | Age: 56
End: 2021-12-03

## 2021-12-03 VITALS
WEIGHT: 176 LBS | BODY MASS INDEX: 25.2 KG/M2 | HEART RATE: 99 BPM | DIASTOLIC BLOOD PRESSURE: 88 MMHG | SYSTOLIC BLOOD PRESSURE: 124 MMHG | HEIGHT: 70 IN

## 2021-12-03 DIAGNOSIS — I48.0 PAF (PAROXYSMAL ATRIAL FIBRILLATION) (HCC): Primary | Chronic | ICD-10-CM

## 2021-12-03 PROCEDURE — 99213 OFFICE O/P EST LOW 20 MIN: CPT | Performed by: INTERNAL MEDICINE

## 2021-12-05 PROCEDURE — 93000 ELECTROCARDIOGRAM COMPLETE: CPT | Performed by: INTERNAL MEDICINE

## 2021-12-06 NOTE — PROGRESS NOTES
Date of Office Visit: 2021  Encounter Provider: Bryn Finch MD  Place of Service: [facility] CARDIOLOGY  Patient Name: Domo Fairchild  :1965    Chief Complaint   Patient presents with   • paroxysmal AFIB     2 month f/u    • afib w/RVR   :     HPI: Domo Fairchild is a 56 y.o. male who presents today for follow-up of paroxysmal atrial fibrillation.     The patient has done well since ablation.  No atrial fibrillation events.     His CHADS VASC score is 0          Past Medical History:   Diagnosis Date   • Atrial fibrillation (CMS/HCC)    • Depression    • PAF (paroxysmal atrial fibrillation) (CMS/HCC)     Underwent an ablation procedure in .   • SOB (shortness of breath)    • Tachycardia        Past Surgical History:   Procedure Laterality Date   • APPENDECTOMY  1978   • CARDIAC ABLATION  2010    [facility]   • CARDIAC ELECTROPHYSIOLOGY PROCEDURE N/A 2021    Procedure: Ablation atrial fibrillation;  Surgeon: Bryn Finch MD;  Location: [facility] INVASIVE LOCATION;  Service: Cardiovascular;  Laterality: N/A;   • HERNIA REPAIR Left 2006    Inguinal hernia, Dr. Coleman Hatch   • HERNIA REPAIR Right 2014    Inguinal hernia, Dr. Coleman Hatch   • INGUINAL HERNIA REPAIR  2007    Coleman Cruz   • VASECTOMY  1997       Social History     Socioeconomic History   • Marital status: Single   • Years of education: College   Tobacco Use   • Smoking status: Current Every Day Smoker     Packs/day: 1.00     Years: 30.00     Pack years: 30.00     Types: Cigarettes   • Smokeless tobacco: Never Used   Vaping Use   • Vaping Use: Never used   Substance and Sexual Activity   • Alcohol use: Yes     Alcohol/week: 2.0 standard drinks     Types: 2 Shots of liquor per week     Comment: 1-2 beers   • Drug use: No   • Sexual activity: Defer       Family History   Adopted: Yes       Review of Systems   Constitutional: Negative.  "  Cardiovascular: Negative.    Respiratory: Negative.    Gastrointestinal: Negative.        Allergies   Allergen Reactions   • Penicillins Shortness Of Breath and Swelling         Current Outpatient Medications:   •  rivaroxaban (Xarelto) 20 MG tablet, Take 1 tablet by mouth Daily., Disp: 30 tablet, Rfl: 6      Objective:     Vitals:    12/03/21 1149   BP: 124/88   Pulse: 99   Weight: 79.8 kg (176 lb)   Height: 177.8 cm (70\")     Body mass index is 25.25 kg/m².    PHYSICAL EXAM:    Vitals and nursing note reviewed.   Constitutional:       Appearance: Normal and healthy appearance.   HENT:      Head: Normocephalic and atraumatic.   Pulmonary:      Effort: Pulmonary effort is normal.   Cardiovascular:      Normal rate. Regular rhythm.      Murmurs: There is no murmur.   Edema:     Peripheral edema absent.   Skin:     General: Skin is warm.   Neurological:      General: No focal deficit present.      Mental Status: Alert and oriented to person, place, and time.   Psychiatric:         Attention and Perception: Attention and perception normal.         Mood and Affect: Mood and affect normal.         Behavior: Behavior is cooperative.             ECG 12 Lead    Date/Time: 12/5/2021 9:23 PM  Performed by: Bryn Finch MD  Authorized by: Bryn Finch MD   Comparison: compared with previous ECG from 9/28/2021  Similar to previous ECG  Rhythm: sinus rhythm              Assessment:       Diagnosis Plan   1. PAF (paroxysmal atrial fibrillation) ([facility])            Plan:       Shared decision made to stop xarelto.      Patient will follow-up in one year.     As always, it has been a pleasure to participate in your patient's care.      Sincerely,         Bryn Finch MD  "

## 2022-08-08 ENCOUNTER — APPOINTMENT (OUTPATIENT)
Dept: CARDIOLOGY | Facility: HOSPITAL | Age: 57
End: 2022-08-08

## 2022-08-08 ENCOUNTER — APPOINTMENT (OUTPATIENT)
Dept: GENERAL RADIOLOGY | Facility: HOSPITAL | Age: 57
End: 2022-08-08

## 2022-08-08 ENCOUNTER — HOSPITAL ENCOUNTER (EMERGENCY)
Facility: HOSPITAL | Age: 57
Discharge: HOME | End: 2022-08-08
Attending: EMERGENCY MEDICINE | Admitting: EMERGENCY MEDICINE

## 2022-08-08 VITALS
BODY MASS INDEX: 25.05 KG/M2 | HEART RATE: 86 BPM | RESPIRATION RATE: 16 BRPM | WEIGHT: 175 LBS | TEMPERATURE: 98 F | HEIGHT: 70 IN | DIASTOLIC BLOOD PRESSURE: 90 MMHG | OXYGEN SATURATION: 96 % | SYSTOLIC BLOOD PRESSURE: 134 MMHG

## 2022-08-08 DIAGNOSIS — R00.2 PALPITATIONS: Primary | ICD-10-CM

## 2022-08-08 LAB
ALBUMIN SERPL-MCNC: 4.1 G/DL (ref 3.5–5.2)
ALBUMIN/GLOB SERPL: 1.6 G/DL
ALP SERPL-CCNC: 103 U/L (ref 39–117)
ALT SERPL W P-5'-P-CCNC: 17 U/L (ref 1–41)
ANION GAP SERPL CALCULATED.3IONS-SCNC: 11.5 MMOL/L (ref 5–15)
AST SERPL-CCNC: 17 U/L (ref 1–40)
BASOPHILS # BLD AUTO: 0.06 10*3/MM3 (ref 0–0.2)
BASOPHILS NFR BLD AUTO: 0.8 % (ref 0–1.5)
BILIRUB SERPL-MCNC: 0.3 MG/DL (ref 0–1.2)
BUN SERPL-MCNC: 12 MG/DL (ref 6–20)
BUN/CREAT SERPL: 16.7 (ref 7–25)
CALCIUM SPEC-SCNC: 8.6 MG/DL (ref 8.6–10.5)
CHLORIDE SERPL-SCNC: 105 MMOL/L (ref 98–107)
CO2 SERPL-SCNC: 23.5 MMOL/L (ref 22–29)
CREAT SERPL-MCNC: 0.72 MG/DL (ref 0.76–1.27)
DEPRECATED RDW RBC AUTO: 44.1 FL (ref 37–54)
EGFRCR SERPLBLD CKD-EPI 2021: 107.9 ML/MIN/1.73
EOSINOPHIL # BLD AUTO: 0.1 10*3/MM3 (ref 0–0.4)
EOSINOPHIL NFR BLD AUTO: 1.4 % (ref 0.3–6.2)
ERYTHROCYTE [DISTWIDTH] IN BLOOD BY AUTOMATED COUNT: 13.1 % (ref 12.3–15.4)
GLOBULIN UR ELPH-MCNC: 2.6 GM/DL
GLUCOSE SERPL-MCNC: 96 MG/DL (ref 65–99)
HCT VFR BLD AUTO: 42 % (ref 37.5–51)
HGB BLD-MCNC: 14.3 G/DL (ref 13–17.7)
HOLD SPECIMEN: NORMAL
IMM GRANULOCYTES # BLD AUTO: 0.03 10*3/MM3 (ref 0–0.05)
IMM GRANULOCYTES NFR BLD AUTO: 0.4 % (ref 0–0.5)
LYMPHOCYTES # BLD AUTO: 3.04 10*3/MM3 (ref 0.7–3.1)
LYMPHOCYTES NFR BLD AUTO: 41.2 % (ref 19.6–45.3)
MAGNESIUM SERPL-MCNC: 2.1 MG/DL (ref 1.6–2.6)
MCH RBC QN AUTO: 31.7 PG (ref 26.6–33)
MCHC RBC AUTO-ENTMCNC: 34 G/DL (ref 31.5–35.7)
MCV RBC AUTO: 93.1 FL (ref 79–97)
MONOCYTES # BLD AUTO: 0.49 10*3/MM3 (ref 0.1–0.9)
MONOCYTES NFR BLD AUTO: 6.6 % (ref 5–12)
NEUTROPHILS NFR BLD AUTO: 3.65 10*3/MM3 (ref 1.7–7)
NEUTROPHILS NFR BLD AUTO: 49.6 % (ref 42.7–76)
NRBC BLD AUTO-RTO: 0 /100 WBC (ref 0–0.2)
PLATELET # BLD AUTO: 266 10*3/MM3 (ref 140–450)
PMV BLD AUTO: 11 FL (ref 6–12)
POTASSIUM SERPL-SCNC: 4.1 MMOL/L (ref 3.5–5.2)
PROT SERPL-MCNC: 6.7 G/DL (ref 6–8.5)
QT INTERVAL: 358 MS
RBC # BLD AUTO: 4.51 10*6/MM3 (ref 4.14–5.8)
SODIUM SERPL-SCNC: 140 MMOL/L (ref 136–145)
TROPONIN T SERPL-MCNC: <0.01 NG/ML (ref 0–0.03)
WBC NRBC COR # BLD: 7.37 10*3/MM3 (ref 3.4–10.8)
WHOLE BLOOD HOLD COAG: NORMAL
WHOLE BLOOD HOLD SPECIMEN: NORMAL

## 2022-08-08 PROCEDURE — 93005 ELECTROCARDIOGRAM TRACING: CPT | Performed by: EMERGENCY MEDICINE

## 2022-08-08 PROCEDURE — 85025 COMPLETE CBC W/AUTO DIFF WBC: CPT | Performed by: EMERGENCY MEDICINE

## 2022-08-08 PROCEDURE — 83735 ASSAY OF MAGNESIUM: CPT | Performed by: EMERGENCY MEDICINE

## 2022-08-08 PROCEDURE — 84484 ASSAY OF TROPONIN QUANT: CPT | Performed by: EMERGENCY MEDICINE

## 2022-08-08 PROCEDURE — 99283 EMERGENCY DEPT VISIT LOW MDM: CPT

## 2022-08-08 PROCEDURE — 71045 X-RAY EXAM CHEST 1 VIEW: CPT

## 2022-08-08 PROCEDURE — 93010 ELECTROCARDIOGRAM REPORT: CPT | Performed by: INTERNAL MEDICINE

## 2022-08-08 PROCEDURE — 99284 EMERGENCY DEPT VISIT MOD MDM: CPT

## 2022-08-08 PROCEDURE — 80053 COMPREHEN METABOLIC PANEL: CPT | Performed by: EMERGENCY MEDICINE

## 2022-08-08 PROCEDURE — 93005 ELECTROCARDIOGRAM TRACING: CPT

## 2022-08-08 PROCEDURE — 93246 EXT ECG>7D<15D RECORDING: CPT

## 2022-08-08 RX ORDER — SODIUM CHLORIDE 0.9 % (FLUSH) 0.9 %
10 SYRINGE (ML) INJECTION AS NEEDED
Status: DISCONTINUED | OUTPATIENT
Start: 2022-08-08 | End: 2022-08-08 | Stop reason: HOSPADM

## 2022-08-08 NOTE — ED PROVIDER NOTES
EMERGENCY DEPARTMENT ENCOUNTER    Room Number:  18/18  Date of encounter:  8/8/2022  PCP: Ngoc Amin MD  Historian: Patient, partner      HPI:  Chief Complaint: Palpitations  A complete HPI/ROS/PMH/PSH/SH/FH are unobtainable due to: None    Context: Domo Fairchild is a 57 y.o. male who presents to the ED c/o palpitations.  He has longstanding history of atrial fibrillation.  He had an ablation 1 year ago and has had no episodes of atrial fibrillation since then.  However this morning when he woke up he noticed that he was back into atrial fibrillation.  He woke up and sinus rhythm but then popped into it around 5:30 AM.  He remained with RVR when he got to work around 6 AM with heart rates in the 150s to 160s.  Therefore, came to the emergency department later today.  However, shortly upon arriving, his heart rate returned to sinus rhythm.  This occurred spontaneously.  Nothing made this worse or better.      PAST MEDICAL HISTORY  Active Ambulatory Problems     Diagnosis Date Noted   • Sleep apnea 06/03/2016   • PAF (paroxysmal atrial fibrillation) ([facility]) 06/03/2016   • Leukocytosis 03/02/2017   • Atrial fibrillation with RVR ([facility]) 06/30/2021   • Abnormal EKG 06/30/2021     Resolved Ambulatory Problems     Diagnosis Date Noted   • No Resolved Ambulatory Problems     Past Medical History:   Diagnosis Date   • Atrial fibrillation (CMS/HCC)    • Depression    • SOB (shortness of breath)    • Tachycardia          PAST SURGICAL HISTORY  Past Surgical History:   Procedure Laterality Date   • APPENDECTOMY  02/1978   • CARDIAC ABLATION  02/2010    [facility]   • CARDIAC ELECTROPHYSIOLOGY PROCEDURE N/A 8/16/2021    Procedure: Ablation atrial fibrillation;  Surgeon: Bryn Finch MD;  Location: [facility] INVASIVE LOCATION;  Service: Cardiovascular;  Laterality: N/A;   • HERNIA REPAIR Left 04/2006    Inguinal hernia, Dr. Coleman Hatch   • HERNIA REPAIR Right 02/2014    Inguinal  hernia, Dr. Coleman Hatch   • INGUINAL HERNIA REPAIR  03/29/2007    Coleman Cruz   • VASECTOMY  07/1997         FAMILY HISTORY  Family History   Adopted: Yes         SOCIAL HISTORY  Social History     Socioeconomic History   • Marital status: Single   • Years of education: College   Tobacco Use   • Smoking status: Current Every Day Smoker     Packs/day: 1.00     Years: 30.00     Pack years: 30.00     Types: Cigarettes   • Smokeless tobacco: Never Used   Vaping Use   • Vaping Use: Never used   Substance and Sexual Activity   • Alcohol use: Yes     Alcohol/week: 2.0 standard drinks     Types: 2 Shots of liquor per week     Comment: 1-2 beers   • Drug use: No   • Sexual activity: Defer         ALLERGIES  Penicillins        REVIEW OF SYSTEMS  Review of Systems     All systems reviewed and negative except for those discussed in HPI.       PHYSICAL EXAM    I have reviewed the triage vital signs and nursing notes.    ED Triage Vitals   Temp Heart Rate Resp BP SpO2   08/08/22 1204 08/08/22 1204 08/08/22 1204 08/08/22 1215 08/08/22 1204   98 °F (36.7 °C) (!) 121 16 132/98 99 %      Temp src Heart Rate Source Patient Position BP Location FiO2 (%)   -- -- -- 08/08/22 1215 --      Right arm        Physical Exam  GENERAL: not distressed  HENT: nares patent  EYES: no scleral icterus  CV: regular rhythm, regular rate at the time of my exam  RESPIRATORY: normal effort, clear to auscultation bilaterally  MUSCULOSKELETAL: no deformity  NEURO: alert, moves all extremities, follows commands  SKIN: warm, dry        LAB RESULTS  Recent Results (from the past 24 hour(s))   ECG 12 Lead    Collection Time: 08/08/22 12:08 PM   Result Value Ref Range    QT Interval 358 ms   Lavender Top    Collection Time: 08/08/22  1:58 PM   Result Value Ref Range    Extra Tube hold for add-on    Light Blue Top    Collection Time: 08/08/22  1:58 PM   Result Value Ref Range    Extra Tube Hold for add-ons.    CBC Auto Differential    Collection Time:  08/08/22  1:58 PM    Specimen: Blood   Result Value Ref Range    WBC 7.37 3.40 - 10.80 10*3/mm3    RBC 4.51 4.14 - 5.80 10*6/mm3    Hemoglobin 14.3 13.0 - 17.7 g/dL    Hematocrit 42.0 37.5 - 51.0 %    MCV 93.1 79.0 - 97.0 fL    MCH 31.7 26.6 - 33.0 pg    MCHC 34.0 31.5 - 35.7 g/dL    RDW 13.1 12.3 - 15.4 %    RDW-SD 44.1 37.0 - 54.0 fl    MPV 11.0 6.0 - 12.0 fL    Platelets 266 140 - 450 10*3/mm3    Neutrophil % 49.6 42.7 - 76.0 %    Lymphocyte % 41.2 19.6 - 45.3 %    Monocyte % 6.6 5.0 - 12.0 %    Eosinophil % 1.4 0.3 - 6.2 %    Basophil % 0.8 0.0 - 1.5 %    Immature Grans % 0.4 0.0 - 0.5 %    Neutrophils, Absolute 3.65 1.70 - 7.00 10*3/mm3    Lymphocytes, Absolute 3.04 0.70 - 3.10 10*3/mm3    Monocytes, Absolute 0.49 0.10 - 0.90 10*3/mm3    Eosinophils, Absolute 0.10 0.00 - 0.40 10*3/mm3    Basophils, Absolute 0.06 0.00 - 0.20 10*3/mm3    Immature Grans, Absolute 0.03 0.00 - 0.05 10*3/mm3    nRBC 0.0 0.0 - 0.2 /100 WBC   Comprehensive Metabolic Panel    Collection Time: 08/08/22  1:59 PM    Specimen: Blood   Result Value Ref Range    Glucose 96 65 - 99 mg/dL    BUN 12 6 - 20 mg/dL    Creatinine 0.72 (L) 0.76 - 1.27 mg/dL    Sodium 140 136 - 145 mmol/L    Potassium 4.1 3.5 - 5.2 mmol/L    Chloride 105 98 - 107 mmol/L    CO2 23.5 22.0 - 29.0 mmol/L    Calcium 8.6 8.6 - 10.5 mg/dL    Total Protein 6.7 6.0 - 8.5 g/dL    Albumin 4.10 3.50 - 5.20 g/dL    ALT (SGPT) 17 1 - 41 U/L    AST (SGOT) 17 1 - 40 U/L    Alkaline Phosphatase 103 39 - 117 U/L    Total Bilirubin 0.3 0.0 - 1.2 mg/dL    Globulin 2.6 gm/dL    A/G Ratio 1.6 g/dL    BUN/Creatinine Ratio 16.7 7.0 - 25.0    Anion Gap 11.5 5.0 - 15.0 mmol/L    eGFR 107.9 >60.0 mL/min/1.73   Magnesium    Collection Time: 08/08/22  1:59 PM    Specimen: Blood   Result Value Ref Range    Magnesium 2.1 1.6 - 2.6 mg/dL   Troponin    Collection Time: 08/08/22  1:59 PM    Specimen: Blood   Result Value Ref Range    Troponin T <0.010 0.000 - 0.030 ng/mL   Green Top (Gel)     Collection Time: 08/08/22  1:59 PM   Result Value Ref Range    Extra Tube Hold for add-ons.    Holter Monitor - 72 Hour Up To 15 Days    Collection Time: 08/08/22  3:12 PM   Result Value Ref Range    Target HR (85%) 140 bpm    Max. Pred. HR (100%) 165 bpm       Ordered the above labs and independently reviewed the results.        RADIOLOGY  XR Chest 1 View    Result Date: 8/8/2022  XR CHEST 1 VW-  HISTORY: Male who is 57 years-old,  dysrhythmia  TECHNIQUE: Frontal view of the chest  COMPARISON: 06/30/2021  FINDINGS: Heart, mediastinum and pulmonary vasculature are unremarkable. No focal pulmonary consolidation, pleural effusion, or pneumothorax. No acute osseous process.      No evidence for acute pulmonary process. Follow-up as clinical indications persist.  This report was finalized on 8/8/2022 1:21 PM by Dr. Luis Celis M.D.        I ordered the above noted radiological studies. Reviewed by me and discussed with radiologist.  See dictation for official radiology interpretation.      PROCEDURES    Procedures      MEDICATIONS GIVEN IN ER    Medications   sodium chloride 0.9 % flush 10 mL (has no administration in time range)         PROGRESS, DATA ANALYSIS, CONSULTS, AND MEDICAL DECISION MAKING    All labs have been independently reviewed by me.  All radiology studies have been reviewed by me and discussed with radiologist dictating the report.   EKG's independently viewed and interpreted by me.  Discussion below represents my analysis of pertinent findings related to patient's condition, differential diagnosis, treatment plan and final disposition.        ED Course as of 08/08/22 1543   Mon Aug 08, 2022   1340 EKG interpreted by myself.  Time 12:08 PM.  Sinus rhythm.  Heart rate 105.  Normal axis.  Right bundle branch block.  No acute ST abnormality. [TD]   1341 Chest x-ray interpreted by myself.  No evidence of pneumonia. [TD]   1543 Troponin T: <0.010 [TD]   1543 WBC: 7.37 [TD]   1543 Hemoglobin: 14.3  [TD]   1543 Creatinine(!): 0.72 [TD]   1543 Sodium: 140 [TD]   1543 Potassium: 4.1 [TD]      ED Course User Index  [TD] Etienne Arellano II, MD       I discussed the case with Dr. Finch, cardiology.  Reviewed patient's labs and history with him.  He recommends placing Zio patch.  No medication changes at this time.  He will follow-up with patient in approximately 2 weeks.    PPE: The patient wore a surgical mask throughout the entire patient encounter. I wore an N95.    AS OF 15:43 EDT VITALS:    BP - 134/90  HR - 86  TEMP - 98 °F (36.7 °C)  O2 SATS - 96%        DIAGNOSIS  Final diagnoses:   Palpitations         DISPOSITION  DISCHARGE    FOLLOW-UP  Ngoc Amin MD  [address] [facility] [id number] [phone]    Schedule an appointment as soon as possible for a visit   As needed    Bryn Finch MD  [address] [facility] [address]  [facility] [id number] [phone]    Schedule an appointment as soon as possible for a visit in 2 weeks      [facility] Emergency Department  [address] [address] [phone]  Go to   If symptoms worsen         Medication List      No changes were made to your prescriptions during this visit.                  Etienne Arellano II, MD  08/08/22 [id number]

## 2022-08-08 NOTE — ED NOTES
Pt stated he went into afib at 0425. He noted he had shortness of breath and no chest pain. He says he feels normal now.

## 2022-08-29 LAB
MAXIMAL PREDICTED HEART RATE: 165 BPM
STRESS TARGET HR: 140 BPM

## 2022-08-29 PROCEDURE — 93248 EXT ECG>7D<15D REV&INTERPJ: CPT | Performed by: INTERNAL MEDICINE

## 2022-08-30 ENCOUNTER — OFFICE VISIT (OUTPATIENT)
Dept: CARDIOLOGY | Facility: CLINIC | Age: 57
End: 2022-08-30

## 2022-08-30 VITALS
DIASTOLIC BLOOD PRESSURE: 88 MMHG | HEIGHT: 70 IN | HEART RATE: 90 BPM | BODY MASS INDEX: 24.77 KG/M2 | WEIGHT: 173 LBS | SYSTOLIC BLOOD PRESSURE: 142 MMHG

## 2022-08-30 DIAGNOSIS — I48.0 PAF (PAROXYSMAL ATRIAL FIBRILLATION): Primary | Chronic | ICD-10-CM

## 2022-08-30 PROCEDURE — 99214 OFFICE O/P EST MOD 30 MIN: CPT

## 2022-08-30 PROCEDURE — 93000 ELECTROCARDIOGRAM COMPLETE: CPT

## 2022-08-30 RX ORDER — METOPROLOL SUCCINATE 25 MG/1
25 TABLET, EXTENDED RELEASE ORAL DAILY
Qty: 30 TABLET | Refills: 5 | Status: SHIPPED | OUTPATIENT
Start: 2022-08-30

## 2022-08-30 RX ORDER — ROSUVASTATIN CALCIUM 10 MG/1
10 TABLET, COATED ORAL DAILY
COMMUNITY
Start: 2022-07-01

## 2022-08-30 NOTE — PROGRESS NOTES
Date of Office Visit: 2022  Encounter Provider: JULIO Lujan  Place of Service: [facility] CARDIOLOGY  Patient Name: Domo Fairchild  :1965    Chief Complaint   Patient presents with   • Atrial Fibrillation     [facility] ER F/U    • Palpitations   :     HPI: Domo Fairchild is a 57 y.o. male who is followed by Dr. Delacruz and Dr. Finch. He has a history of paroxsymal atrial fibrillation--s/p PVI (2021).    He saw Dr. Finch in December and was doing very well at that time.  He had not had any recurrence of AF since his ablation in August.  He was out of the blanking period and doing well. His metoprolol and R-ban were stopped and he was advised to follow up as needed.     On 2022 he presented to the ED with complaints of palpitations and tachycardia. He woke up and went downstairs to get coffee and suddenly went into AF.  He could tell because it felt similar to what he experienced in the past. After a couple of hours his HR was still 150bpm so he came to the ED. Before he arrived he ended up converting. He was sent home with Zio patch and advised to follow up in the office.             He presents today for follow up appt.     He says that since  he has been doing very well.  That was the first episode he was aware of since the ablation in .     He does not think he did anything to provoke this as he had just woken up.     He has not had any other episodes since then.  He denies any chest pain, dyspnea, palpitations, or fatigue.    zio patch after discharge was essentially normal. No evidence of AF. One episode of NSVT-6 beats.     EKG today shows NSR, 90bpm and incomplete RBBB.     He stopped R-ban 3 months after ablation and is not currently on AC.   [id number] is a 0.     He is not on any rate controlling medications.            Past Medical History:   Diagnosis Date   • Atrial fibrillation (HCC)    • Depression    • PAF (paroxysmal atrial fibrillation) ([facility])      Underwent an ablation procedure in 2010.   • SOB (shortness of breath)    • Tachycardia        Past Surgical History:   Procedure Laterality Date   • APPENDECTOMY  02/1978   • CARDIAC ABLATION  02/2010    [facility]   • CARDIAC ELECTROPHYSIOLOGY PROCEDURE N/A 8/16/2021    Procedure: Ablation atrial fibrillation;  Surgeon: Bryn Finch MD;  Location: [facility] INVASIVE LOCATION;  Service: Cardiovascular;  Laterality: N/A;   • HERNIA REPAIR Left 04/2006    Inguinal hernia, Dr. Coleman Hatch   • HERNIA REPAIR Right 02/2014    Inguinal hernia, Dr. Coleman Hatch   • INGUINAL HERNIA REPAIR  03/29/2007    [facility] Cruz   • VASECTOMY  07/1997       Social History     Socioeconomic History   • Marital status: Single   • Years of education: College   Tobacco Use   • Smoking status: Current Every Day Smoker     Packs/day: 1.00     Years: 30.00     Pack years: 30.00     Types: Cigarettes   • Smokeless tobacco: Never Used   Vaping Use   • Vaping Use: Never used   Substance and Sexual Activity   • Alcohol use: Yes     Alcohol/week: 2.0 standard drinks     Types: 2 Shots of liquor per week     Comment: 1-2 beers   • Drug use: No   • Sexual activity: Defer       Family History   Adopted: Yes       Review of Systems   Constitutional: Negative for chills, fever and malaise/fatigue.   Cardiovascular: Negative for chest pain, dyspnea on exertion, leg swelling, near-syncope, orthopnea, palpitations, paroxysmal nocturnal dyspnea and syncope.   Respiratory: Negative for cough and shortness of breath.    Hematologic/Lymphatic: Negative.    Musculoskeletal: Negative for joint pain, joint swelling and myalgias.   Gastrointestinal: Negative for abdominal pain, diarrhea, melena, nausea and vomiting.   Genitourinary: Negative for frequency and hematuria.   Neurological: Negative for light-headedness, numbness, paresthesias and seizures.   Allergic/Immunologic: Negative.    All other systems reviewed and  "are negative.      Allergies   Allergen Reactions   • Penicillins Shortness Of Breath and Swelling         Current Outpatient Medications:   •  rosuvastatin (CRESTOR) 10 MG tablet, Take 10 mg by mouth Daily., Disp: , Rfl:       Objective:     Vitals:    08/30/22 1033   BP: 142/88   Pulse: 90   Weight: 78.5 kg (173 lb)   Height: 177.8 cm (70\")     Body mass index is 24.82 kg/m².    PHYSICAL EXAM:    Vitals Reviewed.   General Appearance: No acute distress, well developed and well nourished.   Eyes: Conjunctiva and lids: No erythema, swelling, or discharge. Sclera non-icteric.   HENT: Atraumatic, normocephalic. External eyes, ears, and nose normal.   Respiratory: No signs of respiratory distress. Respiration rhythm and depth normal.   Clear to auscultation. No rales, crackles, rhonchi, or wheezing auscultated.   Cardiovascular:  Heart Rate and Rhythm: Normal, Heart Sounds: Normal S1 and S2. No S3 or S4 noted.  Gastrointestinal:  Abdomen soft, non-distended, non-tender.   Musculoskeletal: Normal movement of extremities  Skin: Warm and dry.   Psychiatric: Patient alert and oriented to person, place, and time. Speech and behavior appropriate. Normal mood and affect.       ECG 12 Lead    Date/Time: 8/30/2022 11:27 AM  Performed by: Rory Bustillo APRN  Authorized by: Rory Bustillo APRN   Comparison: compared with previous ECG   Similar to previous ECG  Rhythm: sinus rhythm  BPM: 90  Conduction: incomplete right bundle branch block              Assessment:       Diagnosis Plan   1. PAF (paroxysmal atrial fibrillation) ([facility])            Plan:   1. PAF--s/p PVI (8/2021-Josette)--- he recently had an episode of AF which was his first since his ablation one year ago.  He spontaneously converted prior to arriving to ED and monitor post discharge showed no evidence of AF.  We had a long discussion about his AF.  Since this is his first episode in over a year he would like to monitor and see how things go.     I did start him " on metoprolol 25mg daily for his BP and to take if he has a sustained episode of AF.     We also discussed AC which he really does not want to take. He is borderline HTN so [id number] is 0-1. I recommended that he start it but since this is his first episode in a year, he is not going to take AC right now. I did give him samples and asked that if he has another episode of AF then he needs to start taking it and let us know so we can discuss further treatment.       He will follow up with Dr. Finch in 6 months.     As always, it has been a pleasure to participate in your patient's care.      Sincerely,         JULIO Lujan

## 2022-12-09 ENCOUNTER — OFFICE VISIT (OUTPATIENT)
Dept: CARDIOLOGY | Facility: CLINIC | Age: 57
End: 2022-12-09

## 2022-12-09 VITALS
SYSTOLIC BLOOD PRESSURE: 136 MMHG | WEIGHT: 170 LBS | HEIGHT: 70 IN | DIASTOLIC BLOOD PRESSURE: 88 MMHG | BODY MASS INDEX: 24.34 KG/M2 | HEART RATE: 91 BPM

## 2022-12-09 DIAGNOSIS — I45.10 RBBB: ICD-10-CM

## 2022-12-09 DIAGNOSIS — Z98.890 H/O CARDIAC RADIOFREQUENCY ABLATION: ICD-10-CM

## 2022-12-09 DIAGNOSIS — I48.0 PAF (PAROXYSMAL ATRIAL FIBRILLATION): Primary | Chronic | ICD-10-CM

## 2022-12-09 PROCEDURE — 99213 OFFICE O/P EST LOW 20 MIN: CPT | Performed by: NURSE PRACTITIONER

## 2022-12-09 PROCEDURE — 93000 ELECTROCARDIOGRAM COMPLETE: CPT | Performed by: NURSE PRACTITIONER

## 2022-12-09 NOTE — PROGRESS NOTES
Date of Office Visit: 2022  Encounter Provider: JULIO Signh  Place of Service: [facility] CARDIOLOGY  Patient Name: Domo Fairchild  :1965    Chief Complaint   Patient presents with   • Atrial Fibrillation     1 year follow up   :     HPI: Domo Fairchild is a 57 y.o. male who follows with Dr. Finch---PAF, s/p PVI 2021.     Saw Dr. Finch 2021 was doing well, no recurrence. AC stopped.    ED 2022 with tachycardia that lasted a couple of hours but he converted just before he arrived.     Saw Rory late August,  which showed no AF. 2 short runs of NSVT--metoprolol and instructed on starting AC again if any recurrent/prolonged episodes of AF.     Presents for follow up.     Doing great. No chest pain, dyspnea, PND, orthopnea, dizziness, edema or palpitations.     No episodes of any arrhythmias.      Past Medical History:   Diagnosis Date   • Atrial fibrillation (HCC)    • Depression    • PAF (paroxysmal atrial fibrillation) (HCC)     Underwent an ablation procedure in .   • SOB (shortness of breath)    • Tachycardia        Past Surgical History:   Procedure Laterality Date   • APPENDECTOMY  1978   • CARDIAC ABLATION  2010    [facility]   • CARDIAC ELECTROPHYSIOLOGY PROCEDURE N/A 2021    Procedure: Ablation atrial fibrillation;  Surgeon: Bryn Finch MD;  Location: [facility] INVASIVE LOCATION;  Service: Cardiovascular;  Laterality: N/A;   • HERNIA REPAIR Left 2006    Inguinal hernia, Dr. Coleman Hatch   • HERNIA REPAIR Right 2014    Inguinal hernia, Dr. Coleman Hatch   • INGUINAL HERNIA REPAIR  2007    [facility] Cruz   • VASECTOMY  1997       Social History     Socioeconomic History   • Marital status: Single   • Years of education: College   Tobacco Use   • Smoking status: Every Day     Packs/day: 1.00     Years: 30.00     Pack years: 30.00     Types: Cigarettes   • Smokeless tobacco: Never  "  Vaping Use   • Vaping Use: Never used   Substance and Sexual Activity   • Alcohol use: Yes     Alcohol/week: 2.0 standard drinks     Types: 2 Shots of liquor per week     Comment: 1-2 beers   • Drug use: No   • Sexual activity: Defer       Family History   Adopted: Yes       Review of Systems   Constitutional: Negative for chills, fever and malaise/fatigue.   Cardiovascular: Negative for chest pain, dyspnea on exertion, leg swelling, near-syncope, orthopnea, palpitations, paroxysmal nocturnal dyspnea and syncope.   Respiratory: Negative for cough and shortness of breath.    Hematologic/Lymphatic: Negative.    Musculoskeletal: Negative for joint pain, joint swelling and myalgias.   Gastrointestinal: Negative for abdominal pain, diarrhea, melena, nausea and vomiting.   Genitourinary: Negative for frequency and hematuria.   Neurological: Negative for light-headedness, numbness, paresthesias and seizures.   Allergic/Immunologic: Negative.    All other systems reviewed and are negative.      Allergies   Allergen Reactions   • Penicillins Shortness Of Breath and Swelling         Current Outpatient Medications:   •  metoprolol succinate XL (TOPROL-XL) 25 MG 24 hr tablet, Take 1 tablet by mouth Daily., Disp: 30 tablet, Rfl: 5  •  rivaroxaban (Xarelto) 20 MG tablet, Take 1 tablet by mouth Daily. Gave samples, is going to start taking if he has another episode., Disp: 30 tablet, Rfl: 6  •  rosuvastatin (CRESTOR) 10 MG tablet, Take 10 mg by mouth Daily., Disp: , Rfl:       Objective:     Vitals:    12/09/22 1133   BP: 136/88   Pulse: 91   Weight: 77.1 kg (170 lb)   Height: 177.8 cm (70\")     Body mass index is 24.39 kg/m².    PHYSICAL EXAM:    Vitals Reviewed.   General Appearance: No acute distress, well developed and well nourished.   Eyes: Conjunctiva and lids: No erythema, swelling, or discharge. Sclera non-icteric.   HENT: Atraumatic, normocephalic. External eyes, ears, and nose normal.   Respiratory: No signs of " respiratory distress. Respiration rhythm and depth normal.   Clear to auscultation. No rales, crackles, rhonchi, or wheezing auscultated.   Cardiovascular:  Heart Rate and Rhythm: Normal, Heart Sounds: Normal S1 and S2. No S3 or S4 noted.  Murmurs: No murmurs noted. No rubs, thrills, or gallops.   Arterial Pulses:  Posterior tibialis and dorsalis pedis pulses normal.   Lower Extremities: No edema noted.  Gastrointestinal:  Abdomen soft, non-distended, non-tender.   Musculoskeletal: Normal movement of extremities  Skin: Warm and dry.   Psychiatric: Patient alert and oriented to person, place, and time. Speech and behavior appropriate. Normal mood and affect.       ECG 12 Lead    Date/Time: 12/9/2022 12:19 PM  Performed by: Summer Avalos APRN  Authorized by: Summer Avalos APRN   Comparison: compared with previous ECG   Similar to previous ECG  Rhythm: sinus rhythm  BPM: 91  Conduction: right bundle branch block              Assessment:       Diagnosis Plan   1. PAF (paroxysmal atrial fibrillation) ([facility])        2. H/O cardiac ablation--PVI 6/2021        3. RBBB               Plan:       1.-2. PAF, s/p PVI 6/2021, 1 episode of tachycardia 8/8/2022---but had converted back to SR before he got to ED---he is pretty sure it was AF, felt just like it did prior to ablation. Follow up Zio---no AF.     No further episodes, doing great. Has rivaroxaban to start if recurrent prolonged episodes. Metoprolol.     3. RBBB, unchanged from August---possibly rate related --not always present but has had intermittently for a couple of years. Nuc 7/2021, no ischemia, normal EF.     Follow up with Dr. Finch in 6 months.     As always, it has been a pleasure to participate in your patient's care.      Sincerely,         JULIO Samson

## 2023-03-04 ENCOUNTER — HOSPITAL ENCOUNTER (EMERGENCY)
Facility: HOSPITAL | Age: 58
Discharge: HOME | End: 2023-03-04
Attending: EMERGENCY MEDICINE | Admitting: EMERGENCY MEDICINE
Payer: COMMERCIAL

## 2023-03-04 ENCOUNTER — APPOINTMENT (OUTPATIENT)
Dept: GENERAL RADIOLOGY | Facility: HOSPITAL | Age: 58
End: 2023-03-04
Payer: COMMERCIAL

## 2023-03-04 VITALS
HEIGHT: 70 IN | BODY MASS INDEX: 24.34 KG/M2 | OXYGEN SATURATION: 99 % | DIASTOLIC BLOOD PRESSURE: 95 MMHG | HEART RATE: 80 BPM | TEMPERATURE: 97.8 F | SYSTOLIC BLOOD PRESSURE: 137 MMHG | WEIGHT: 170 LBS | RESPIRATION RATE: 16 BRPM

## 2023-03-04 DIAGNOSIS — R07.89 CHEST WALL PAIN: Primary | ICD-10-CM

## 2023-03-04 DIAGNOSIS — Z86.79 PERSONAL HISTORY OF ATRIAL FIBRILLATION: ICD-10-CM

## 2023-03-04 LAB
ALBUMIN SERPL-MCNC: 4.4 G/DL (ref 3.5–5.2)
ALBUMIN/GLOB SERPL: 1.6 G/DL
ALP SERPL-CCNC: 105 U/L (ref 39–117)
ALT SERPL W P-5'-P-CCNC: 15 U/L (ref 1–41)
ANION GAP SERPL CALCULATED.3IONS-SCNC: 9 MMOL/L (ref 5–15)
AST SERPL-CCNC: 17 U/L (ref 1–40)
BASOPHILS # BLD AUTO: 0.05 10*3/MM3 (ref 0–0.2)
BASOPHILS NFR BLD AUTO: 0.7 % (ref 0–1.5)
BILIRUB SERPL-MCNC: 0.5 MG/DL (ref 0–1.2)
BUN SERPL-MCNC: 9 MG/DL (ref 6–20)
BUN/CREAT SERPL: 13.6 (ref 7–25)
CALCIUM SPEC-SCNC: 9.1 MG/DL (ref 8.6–10.5)
CHLORIDE SERPL-SCNC: 104 MMOL/L (ref 98–107)
CO2 SERPL-SCNC: 25 MMOL/L (ref 22–29)
CREAT SERPL-MCNC: 0.66 MG/DL (ref 0.76–1.27)
D DIMER PPP FEU-MCNC: <0.27 MCGFEU/ML (ref 0–0.55)
DEPRECATED RDW RBC AUTO: 47.7 FL (ref 37–54)
EGFRCR SERPLBLD CKD-EPI 2021: 110.8 ML/MIN/1.73
EOSINOPHIL # BLD AUTO: 0.19 10*3/MM3 (ref 0–0.4)
EOSINOPHIL NFR BLD AUTO: 2.6 % (ref 0.3–6.2)
ERYTHROCYTE [DISTWIDTH] IN BLOOD BY AUTOMATED COUNT: 13.5 % (ref 12.3–15.4)
ERYTHROCYTE [SEDIMENTATION RATE] IN BLOOD: 8 MM/HR (ref 0–20)
GLOBULIN UR ELPH-MCNC: 2.7 GM/DL
GLUCOSE SERPL-MCNC: 95 MG/DL (ref 65–99)
HCT VFR BLD AUTO: 43.8 % (ref 37.5–51)
HGB BLD-MCNC: 14.9 G/DL (ref 13–17.7)
IMM GRANULOCYTES # BLD AUTO: 0.01 10*3/MM3 (ref 0–0.05)
IMM GRANULOCYTES NFR BLD AUTO: 0.1 % (ref 0–0.5)
INR PPP: 0.94 (ref 0.9–1.1)
LYMPHOCYTES # BLD AUTO: 2.8 10*3/MM3 (ref 0.7–3.1)
LYMPHOCYTES NFR BLD AUTO: 38.7 % (ref 19.6–45.3)
MCH RBC QN AUTO: 32.4 PG (ref 26.6–33)
MCHC RBC AUTO-ENTMCNC: 34 G/DL (ref 31.5–35.7)
MCV RBC AUTO: 95.2 FL (ref 79–97)
MONOCYTES # BLD AUTO: 0.44 10*3/MM3 (ref 0.1–0.9)
MONOCYTES NFR BLD AUTO: 6.1 % (ref 5–12)
NEUTROPHILS NFR BLD AUTO: 3.75 10*3/MM3 (ref 1.7–7)
NEUTROPHILS NFR BLD AUTO: 51.8 % (ref 42.7–76)
NRBC BLD AUTO-RTO: 0 /100 WBC (ref 0–0.2)
PLATELET # BLD AUTO: 234 10*3/MM3 (ref 140–450)
PMV BLD AUTO: 10.7 FL (ref 6–12)
POTASSIUM SERPL-SCNC: 3.8 MMOL/L (ref 3.5–5.2)
PROT SERPL-MCNC: 7.1 G/DL (ref 6–8.5)
PROTHROMBIN TIME: 12.6 SECONDS (ref 11.7–14.2)
QT INTERVAL: 391 MS
RBC # BLD AUTO: 4.6 10*6/MM3 (ref 4.14–5.8)
SODIUM SERPL-SCNC: 138 MMOL/L (ref 136–145)
TROPONIN T SERPL HS-MCNC: 7 NG/L
WBC NRBC COR # BLD: 7.24 10*3/MM3 (ref 3.4–10.8)

## 2023-03-04 PROCEDURE — 93005 ELECTROCARDIOGRAM TRACING: CPT

## 2023-03-04 PROCEDURE — 85379 FIBRIN DEGRADATION QUANT: CPT | Performed by: EMERGENCY MEDICINE

## 2023-03-04 PROCEDURE — 80053 COMPREHEN METABOLIC PANEL: CPT | Performed by: EMERGENCY MEDICINE

## 2023-03-04 PROCEDURE — 85652 RBC SED RATE AUTOMATED: CPT | Performed by: EMERGENCY MEDICINE

## 2023-03-04 PROCEDURE — 85610 PROTHROMBIN TIME: CPT | Performed by: EMERGENCY MEDICINE

## 2023-03-04 PROCEDURE — 71045 X-RAY EXAM CHEST 1 VIEW: CPT

## 2023-03-04 PROCEDURE — 93010 ELECTROCARDIOGRAM REPORT: CPT | Performed by: INTERNAL MEDICINE

## 2023-03-04 PROCEDURE — 99284 EMERGENCY DEPT VISIT MOD MDM: CPT

## 2023-03-04 PROCEDURE — 85025 COMPLETE CBC W/AUTO DIFF WBC: CPT | Performed by: EMERGENCY MEDICINE

## 2023-03-04 PROCEDURE — 93005 ELECTROCARDIOGRAM TRACING: CPT | Performed by: EMERGENCY MEDICINE

## 2023-03-04 PROCEDURE — 84484 ASSAY OF TROPONIN QUANT: CPT | Performed by: EMERGENCY MEDICINE

## 2023-03-04 RX ORDER — SODIUM CHLORIDE 0.9 % (FLUSH) 0.9 %
10 SYRINGE (ML) INJECTION AS NEEDED
Status: DISCONTINUED | OUTPATIENT
Start: 2023-03-04 | End: 2023-03-04 | Stop reason: HOSPADM

## 2023-03-04 NOTE — DISCHARGE INSTRUCTIONS
Go home and rest this weekend.  Tylenol or Motrin as needed for pain.  Call Dr. Finch for follow-up appointment or return to the emergency room if worse

## 2023-03-04 NOTE — ED PROVIDER NOTES
" EMERGENCY DEPARTMENT ENCOUNTER    Room Number:  12/12  Date seen:  3/4/2023  PCP: Ngoc Amin MD  Historian: Patient      HPI:  Chief Complaint: Chest pain    Context: Domo Fairchild is a 57 y.o. male who presents to the ED c/o chest pain for the past 2 days that he states is worse with movement and breathing.  The patient states that began when he woke up yesterday morning and has been constant since then.  He describes it as if his chest have been \"bruised\".  However he denies any recent trauma or straining.  The patient does have history of atrial fibrillation and is on metoprolol.  He states his cardiologist told him he did not need to take his Xarelto unless he felt he was in A-fib which he has not.  He does not believe he is taken his Xarelto for over a year.  The patient went to urgent care where he was told he had an abnormal EKG and was sent to the emergency room for further evaluation.  The patient denies shortness of breath, radiation of pain, nausea, diaphoresis, lower extreme pain or lower extremity swelling      PAST MEDICAL HISTORY  Active Ambulatory Problems     Diagnosis Date Noted   • Sleep apnea 06/03/2016   • PAF (paroxysmal atrial fibrillation) ([facility]) 06/03/2016   • Leukocytosis 03/02/2017   • Atrial fibrillation with RVR ([facility]) 06/30/2021   • Abnormal EKG 06/30/2021   • H/O cardiac ablation--PVI 6/2021 12/09/2022   • RBBB 12/09/2022     Resolved Ambulatory Problems     Diagnosis Date Noted   • No Resolved Ambulatory Problems     Past Medical History:   Diagnosis Date   • Atrial fibrillation (HCC)    • Depression    • SOB (shortness of breath)    • Tachycardia          REVIEW OF SYSTEMS  All systems reviewed and negative except for those discussed in HPI.       PAST SURGICAL HISTORY  Past Surgical History:   Procedure Laterality Date   • APPENDECTOMY  02/1978   • CARDIAC ABLATION  02/2010    [facility]   • CARDIAC ELECTROPHYSIOLOGY PROCEDURE N/A 8/16/2021    " Procedure: Ablation atrial fibrillation;  Surgeon: Bryn Finch MD;  Location: [facility] INVASIVE LOCATION;  Service: Cardiovascular;  Laterality: N/A;   • HERNIA REPAIR Left 04/2006    Inguinal hernia, Dr. Coleman Hatch   • HERNIA REPAIR Right 02/2014    Inguinal hernia, Dr. Coleman Hatch   • INGUINAL HERNIA REPAIR  03/29/2007    Coleman Cruz   • VASECTOMY  07/1997         FAMILY HISTORY  Family History   Adopted: Yes         SOCIAL HISTORY  Social History     Socioeconomic History   • Marital status: Single   • Years of education: College   Tobacco Use   • Smoking status: Every Day     Packs/day: 1.00     Years: 30.00     Pack years: 30.00     Types: Cigarettes   • Smokeless tobacco: Never   Vaping Use   • Vaping Use: Never used   Substance and Sexual Activity   • Alcohol use: Yes     Alcohol/week: 2.0 standard drinks     Types: 2 Shots of liquor per week     Comment: 1-2 beers   • Drug use: No   • Sexual activity: Defer         ALLERGIES  Penicillins      PHYSICAL EXAM  ED Triage Vitals   Temp Heart Rate Resp BP SpO2   03/04/23 1417 03/04/23 1417 03/04/23 1417 03/04/23 1444 03/04/23 1417   97.8 °F (36.6 °C) 102 18 133/85 98 %      Temp src Heart Rate Source Patient Position BP Location FiO2 (%)   -- -- -- -- --              Physical Exam      GENERAL: 57-year-old male in no acute distress  HENT: NCAT: nares patent: Neck supple  EYES: no scleral icterus  CV: regular rhythm, normal rate: No murmurs: Mild reproducible left anterior chest wall pain  RESPIRATORY: normal effort  ABDOMEN: soft, NTND: Bowel sounds positive  MUSCULOSKELETAL: no deformity  NEURO: alert with nonfocal neuro exam  PSYCH:  calm, cooperative  SKIN: warm, dry    Vital signs and nursing notes reviewed.    PPE pt does not present with symptoms for COVID19; however, I was wearing a mask and goggles throughout all patient interaction.    LAB RESULTS  Recent Results (from the past 24 hour(s))   ECG 12 Lead Chest Pain    Collection Time:  03/04/23  2:22 PM   Result Value Ref Range    QT Interval 391 ms   Comprehensive Metabolic Panel    Collection Time: 03/04/23  3:46 PM    Specimen: Blood   Result Value Ref Range    Glucose 95 65 - 99 mg/dL    BUN 9 6 - 20 mg/dL    Creatinine 0.66 (L) 0.76 - 1.27 mg/dL    Sodium 138 136 - 145 mmol/L    Potassium 3.8 3.5 - 5.2 mmol/L    Chloride 104 98 - 107 mmol/L    CO2 25.0 22.0 - 29.0 mmol/L    Calcium 9.1 8.6 - 10.5 mg/dL    Total Protein 7.1 6.0 - 8.5 g/dL    Albumin 4.4 3.5 - 5.2 g/dL    ALT (SGPT) 15 1 - 41 U/L    AST (SGOT) 17 1 - 40 U/L    Alkaline Phosphatase 105 39 - 117 U/L    Total Bilirubin 0.5 0.0 - 1.2 mg/dL    Globulin 2.7 gm/dL    A/G Ratio 1.6 g/dL    BUN/Creatinine Ratio 13.6 7.0 - 25.0    Anion Gap 9.0 5.0 - 15.0 mmol/L    eGFR 110.8 >60.0 mL/min/1.73   Protime-INR    Collection Time: 03/04/23  3:46 PM    Specimen: Blood   Result Value Ref Range    Protime 12.6 11.7 - 14.2 Seconds    INR 0.94 0.90 - 1.10   D-dimer, Quantitative    Collection Time: 03/04/23  3:46 PM    Specimen: Blood   Result Value Ref Range    D-Dimer, Quantitative <0.27 0.00 - 0.55 MCGFEU/mL   Single High Sensitivity Troponin T    Collection Time: 03/04/23  3:46 PM    Specimen: Blood   Result Value Ref Range    HS Troponin T 7 <15 ng/L   Sedimentation Rate    Collection Time: 03/04/23  3:46 PM    Specimen: Blood   Result Value Ref Range    Sed Rate 8 0 - 20 mm/hr   CBC Auto Differential    Collection Time: 03/04/23  3:46 PM    Specimen: Blood   Result Value Ref Range    WBC 7.24 3.40 - 10.80 10*3/mm3    RBC 4.60 4.14 - 5.80 10*6/mm3    Hemoglobin 14.9 13.0 - 17.7 g/dL    Hematocrit 43.8 37.5 - 51.0 %    MCV 95.2 79.0 - 97.0 fL    MCH 32.4 26.6 - 33.0 pg    MCHC 34.0 31.5 - 35.7 g/dL    RDW 13.5 12.3 - 15.4 %    RDW-SD 47.7 37.0 - 54.0 fl    MPV 10.7 6.0 - 12.0 fL    Platelets 234 140 - 450 10*3/mm3    Neutrophil % 51.8 42.7 - 76.0 %    Lymphocyte % 38.7 19.6 - 45.3 %    Monocyte % 6.1 5.0 - 12.0 %    Eosinophil % 2.6 0.3 - 6.2  %    Basophil % 0.7 0.0 - 1.5 %    Immature Grans % 0.1 0.0 - 0.5 %    Neutrophils, Absolute 3.75 1.70 - 7.00 10*3/mm3    Lymphocytes, Absolute 2.80 0.70 - 3.10 10*3/mm3    Monocytes, Absolute 0.44 0.10 - 0.90 10*3/mm3    Eosinophils, Absolute 0.19 0.00 - 0.40 10*3/mm3    Basophils, Absolute 0.05 0.00 - 0.20 10*3/mm3    Immature Grans, Absolute 0.01 0.00 - 0.05 10*3/mm3    nRBC 0.0 0.0 - 0.2 /100 WBC       Ordered the above labs and reviewed the results.        RADIOLOGY  XR Chest 1 View    Result Date: 3/4/2023  ONE VIEW PORTABLE CHEST  HISTORY: Chest pain.  FINDINGS: The lungs are well-expanded and clear and the heart and hilar structures are normal. There is no acute disease or change from 08/08/2022.  This report was finalized on 3/4/2023 4:07 PM by Dr. Taco Garibay M.D.        Ordered the above noted radiological studies. Reviewed by me in PACS.            PROCEDURES  Procedures    EKG    EKG time: 1422  Rhythm/Rate: Normal sinus rhythm at 98  Right bundle branch block  No Acute Ischemia  Non-Specific ST-T changes    Similar compared to prior on 12/9/2022    Interpreted Contemporaneously by me.  Independently viewed by me      MEDICATIONS GIVEN IN ER  Medications - No data to display          MEDICAL DECISION MAKING, PROGRESS, and CONSULTS    All labs have been independently reviewed by me.  All radiology studies have been reviewed by me and I have also reviewed the radiology report.   EKG's independently viewed and interpreted by me.  Discussion below represents my analysis of pertinent findings related to patient's condition, differential diagnosis, treatment plan and final disposition.      Additional sources:      - External (non-ED) record review: I reviewed the patient's admission from June 2021 when he was diagnosed with A-fib with RVR.  At that time he had a negative Cardiolite.  Dr. Finch is his cardiologist.        - Shared decision making: After shared decision-making discussion the patient I  agree he is stable for discharge and outpatient follow-up      Orders placed during this visit:  Orders Placed This Encounter   Procedures   • XR Chest 1 View   • Comprehensive Metabolic Panel   • Protime-INR   • D-dimer, Quantitative   • Single High Sensitivity Troponin T   • Sedimentation Rate   • CBC Auto Differential   • Pulse Oximetry, Continuous   • Monitor Blood Pressure   • ECG 12 Lead Chest Pain   • CBC & Differential         Differential diagnosis:  My differential diagnosis includes but is not limited to myocardial infarction, acute coronary syndrome, pericarditis, chest wall pain, pneumonia, pulmonary embolism, pneumothorax, or esophageal spasm.      Independent interpretation of labs, radiology studies, and discussions with consultants:  ED Course as of 03/04/23 2320   Sat Mar 04, 2023   1716 My independent interpretation of the patient's chest x-ray is no pneumonia, pneumothorax or cardiomegaly with normal mediastinum. [GP]   1717 The patient's troponin is normal after over 36 hours of constant pain along with an unchanged EKG and negative D-dimer and sed rate. [GP]   1717   Thus I believe the patient is stable for discharge and outpatient follow-up with his cardiologist. [GP]   1718 HEART SCORE:    History #0  (Highly suspicious 2, Moderately suspicious 1, Slightly or non-suspicious 0)    ECG #0  (Significant ST depression 2,  Nonspecific repol disturbance 1, Normal 0)    Age #1  (> or = 65 2, 46-65 1,  < or = 45 0)    Risk factors #2  (hypercholesterolemia, HTN, DM, smoking, pos fam hx, obesity)  (> or = to 3 RF 2, 1 or 2 1, No risk factors 0)    Troponin #0  (> or = 3x normal limit 2, 1-3x normal limit 1, < or = Normal limit 0)    HEART Score Key:  Scores 0-3: 0.9-1.7% risk of adverse cardiac event. In the HEART Score study, these patients were discharged (0.99% in the retrospective study, 1.7% in the prospective study)  Scores 4-6: 12-16.6% risk of adverse cardiac event. In the HEART Score study,  these patients were admitted to the hospital. (11.6% retrospective, 16.6% prospective)  Scores =7: 50-65% risk of adverse cardiac event. In the HEART Score study, these patients were candidates for early invasive measures. (65.2% retrospective, 50.1% prospective)      This patient's HEART score is 3 [GP]   1721 On repeat examination the patient's vital signs are stable and he is in no distress.  He still has mild anterior chest wall discomfort.  I advised him at this time he was stable for discharge home and outpatient follow-up with his cardiologist.  The patient understands and agrees the plan. [GP]      ED Course User Index  [GP] Darian Mendoza MD               DIAGNOSIS  Final diagnoses:   Chest wall pain   Personal history of atrial fibrillation         DISPOSITION  DISCHARGE    Patient discharged in stable condition.    Reviewed implications of results, diagnosis, meds, responsibility to follow up, warning signs and symptoms of possible worsening, potential complications and reasons to return to ER, including worsening pain, shortness of breath or fever.    Patient/Family voiced understanding of above instructions.    Discussed plan for discharge, as there is no emergent indication for admission.  Pt/family is agreeable and understands need for follow up and repeat testing.  Pt is aware that discharge does not mean that nothing is wrong but it indicates no emergency is present and they must continue care with follow-up as given below or physician of their choice.     FOLLOW-UP  Bryn Finch MD  [address] [id number] [phone]    Schedule an appointment as soon as possible for a visit                     Latest Documented Vital Signs:  As of 23:20 EST  BP- 137/95 HR- 80 Temp- 97.8 °F (36.6 °C) O2 sat- 99%--      --------------------  Please note that portions of this were completed with a voice recognition program.       Note Disclaimer: At [facility], we believe that  sharing information builds trust and better relationships. You are receiving this note because you are receiving care at [facility] or recently visited. It is possible you will see health information before a provider has talked with you about it. This kind of information can be easy to misunderstand. To help you fully understand what it means for your health, we urge you to discuss this note with your provider.           Darian Mendoza MD  03/04/23 5263

## 2023-03-04 NOTE — ED TRIAGE NOTES
Patient to ER via car from home for chest pain with breathing and movement  x 2 days    Patient does have hx of afib    Patient wearing mask this RN in PPE

## 2023-04-20 NOTE — LETTER
April 20, 2023    Domo Fairchild  [address]  [place] [facility] [address]        Dear Domo,     Our records indicate that your annual screening for lung cancer is past due. If you haven't already, please consult with your healthcare provider to discuss your eligibility and to set up an appointment for your low-dose CT scan.       If you have decided you do not want this screening performed or you are receiving care elsewhere, please let us know at your earliest convenience so we may update our records.     If you have any questions, please don't hesitate to call.  Thank you for your participation in the lung screening program.      Sincerely,       [facility] [place]   [facility]    [facility]  [phone] office

## 2023-05-17 RX ORDER — ROSUVASTATIN CALCIUM 10 MG/1
TABLET, COATED ORAL
Qty: 90 TABLET | Refills: 0 | Status: SHIPPED | OUTPATIENT
Start: 2023-05-17

## 2023-06-21 PROBLEM — E78.00 HIGH CHOLESTEROL: Status: ACTIVE | Noted: 2019-04-08

## 2023-06-21 PROBLEM — Z72.0 TOBACCO USER: Status: ACTIVE | Noted: 2019-03-28

## 2023-09-07 ENCOUNTER — OFFICE VISIT (OUTPATIENT)
Dept: CARDIOLOGY | Facility: CLINIC | Age: 58
End: 2023-09-07
Payer: COMMERCIAL

## 2023-09-07 VITALS
WEIGHT: 173 LBS | BODY MASS INDEX: 24.22 KG/M2 | DIASTOLIC BLOOD PRESSURE: 86 MMHG | SYSTOLIC BLOOD PRESSURE: 124 MMHG | HEART RATE: 76 BPM | HEIGHT: 71 IN

## 2023-09-07 DIAGNOSIS — I48.0 AF (PAROXYSMAL ATRIAL FIBRILLATION): Primary | ICD-10-CM

## 2023-09-07 DIAGNOSIS — I48.0 PAF (PAROXYSMAL ATRIAL FIBRILLATION): Chronic | ICD-10-CM

## 2023-09-07 DIAGNOSIS — Z98.890 H/O CARDIAC RADIOFREQUENCY ABLATION: ICD-10-CM

## 2023-09-07 PROCEDURE — 93000 ELECTROCARDIOGRAM COMPLETE: CPT | Performed by: INTERNAL MEDICINE

## 2023-09-07 PROCEDURE — 99212 OFFICE O/P EST SF 10 MIN: CPT | Performed by: INTERNAL MEDICINE

## 2023-09-07 NOTE — PROGRESS NOTES
Date of Office Visit: 2023  Encounter Provider: Bryn Finch MD  Place of Service: [facility] CARDIOLOGY  Patient Name: Domo Fairchild  :1965    Chief Complaint   Patient presents with    paroxysmal AFIB    RBBB   :     HPI: Domo Fairchild is a 58 y.o. male who presents today for paroxysmal atrial fibrillation.     He is status post ablation.     He has not had an episode since last August.  No confirmed episode since ablation.      He has no complaints today.            Past Medical History:   Diagnosis Date    Adenoma of left adrenal gland 2023    benign    Atrial fibrillation     Depression     Hyperlipidemia 2023    PAF (paroxysmal atrial fibrillation)     Underwent an ablation procedure in .    Personal history of other diseases of circulatory system 2022    SOB (shortness of breath)     Tachycardia     Tobacco use        Past Surgical History:   Procedure Laterality Date    APPENDECTOMY  1978    CARDIAC ABLATION  2010    [facility]    CARDIAC ELECTROPHYSIOLOGY PROCEDURE N/A 2021    Procedure: Ablation atrial fibrillation;  Surgeon: Bryn Finch MD;  Location: [facility] INVASIVE LOCATION;  Service: Cardiovascular;  Laterality: N/A;    COLONOSCOPY  10/07/2022    HERNIA REPAIR Left 2006    Inguinal hernia, Dr. Coleman Hatch    HERNIA REPAIR Right 2014    Inguinal hernia, Dr. Coleman Hatch    INGUINAL HERNIA REPAIR  2007    Coleman Cruz    INGUINAL HERNIA REPAIR  2007 and 2014    Kassie had two, dates not exact    VASECTOMY  1997       Social History     Socioeconomic History    Marital status: Single    Years of education: College   Tobacco Use    Smoking status: Every Day     Packs/day: 1.00     Years: 40.00     Pack years: 40.00     Types: Cigarettes    Smokeless tobacco: Never    Tobacco comments:     Started in  @ 18   Vaping Use    Vaping Use: Never used   Substance and  "Sexual Activity    Alcohol use: Yes     Alcohol/week: 2.0 standard drinks     Types: 2 Shots of liquor per week     Comment: couple of bourbon drinks weekly    Drug use: No    Sexual activity: Not Currently     Partners: Female     Birth control/protection: Surgical       Family History   Adopted: Yes       Review of Systems   Constitutional: Negative.   Cardiovascular: Negative.    Respiratory: Negative.     Gastrointestinal: Negative.      Allergies   Allergen Reactions    Penicillins Shortness Of Breath and Swelling         Current Outpatient Medications:     metoprolol succinate XL (TOPROL-XL) 25 MG 24 hr tablet, Take 1 tablet by mouth Daily., Disp: 30 tablet, Rfl: 5    rivaroxaban (Xarelto) 20 MG tablet, Take 1 tablet by mouth Daily. Gave samples, is going to start taking if he has another episode. (Patient taking differently: Take 1 tablet by mouth Daily. PRN ONLY FOR AFIB), Disp: 30 tablet, Rfl: 6    rosuvastatin (CRESTOR) 20 MG tablet, Take 1 tablet by mouth Daily., Disp: 90 tablet, Rfl: 1      Objective:     Vitals:    09/07/23 1353   BP: 124/86   Pulse: 76   Weight: 78.5 kg (173 lb)   Height: 180.3 cm (71\")     Body mass index is 24.13 kg/m².    PHYSICAL EXAM:    Vitals and nursing note reviewed.   Constitutional:       General: Not in acute distress.  Pulmonary:      Effort: Pulmonary effort is normal. No respiratory distress.   Cardiovascular:      Normal rate. Regular rhythm.   Edema:     Peripheral edema absent.   Skin:     General: Skin is warm and dry.   Neurological:      Mental Status: Alert and oriented to person, place, and time.   Psychiatric:         Behavior: Behavior normal.         Thought Content: Thought content normal.         Judgment: Judgment normal.           ECG 12 Lead    Date/Time: 9/7/2023 3:46 PM  Performed by: Bryn Finch MD  Authorized by: Bryn Finch MD   Comparison: compared with previous ECG   Rhythm: sinus rhythm          Assessment:       Diagnosis Plan "   1. AF (paroxysmal atrial fibrillation)  ECG 12 Lead      2. PAF (paroxysmal atrial fibrillation)        3. H/O cardiac ablation--PVI 6/2021               Plan:       PAF    No recurrence since ablation.     He is not on any medications currently for his atrial fibrillation.     As always, it has been a pleasure to participate in your patient's care.      Sincerely,         Bryn Finch MD

## 2023-09-21 ENCOUNTER — HOSPITAL ENCOUNTER (OUTPATIENT)
Dept: CT IMAGING | Facility: HOSPITAL | Age: 58
Discharge: HOME | End: 2023-09-21
Admitting: FAMILY MEDICINE

## 2023-09-21 DIAGNOSIS — Z72.0 TOBACCO USER: ICD-10-CM

## 2023-09-21 PROCEDURE — 71271 CT THORAX LUNG CANCER SCR C-: CPT

## 2023-10-02 RX ORDER — METOPROLOL SUCCINATE 25 MG/1
TABLET, EXTENDED RELEASE ORAL
Qty: 90 TABLET | Refills: 3 | Status: SHIPPED | OUTPATIENT
Start: 2023-10-02

## 2023-12-27 RX ORDER — ROSUVASTATIN CALCIUM 20 MG/1
20 TABLET, COATED ORAL DAILY
Qty: 90 TABLET | Refills: 1 | Status: SHIPPED | OUTPATIENT
Start: 2023-12-27

## 2024-01-31 ENCOUNTER — OFFICE VISIT (OUTPATIENT)
Dept: FAMILY MEDICINE CLINIC | Facility: CLINIC | Age: 59
End: 2024-01-31
Payer: COMMERCIAL

## 2024-01-31 VITALS
WEIGHT: 179.8 LBS | OXYGEN SATURATION: 97 % | HEIGHT: 71 IN | BODY MASS INDEX: 25.17 KG/M2 | HEART RATE: 111 BPM | SYSTOLIC BLOOD PRESSURE: 143 MMHG | DIASTOLIC BLOOD PRESSURE: 84 MMHG

## 2024-01-31 DIAGNOSIS — I10 PRIMARY HYPERTENSION: Primary | ICD-10-CM

## 2024-01-31 DIAGNOSIS — R51.9 HEADACHE, UNSPECIFIED HEADACHE TYPE: ICD-10-CM

## 2024-01-31 DIAGNOSIS — E78.00 HYPERCHOLESTEROLEMIA: ICD-10-CM

## 2024-01-31 PROCEDURE — 99214 OFFICE O/P EST MOD 30 MIN: CPT | Performed by: FAMILY MEDICINE

## 2024-01-31 NOTE — PROGRESS NOTES
"Chief Complaint  Headache (Over a year)    Subjective    History of Present Illness {CC  Problem List  Visit  Diagnosis   Encounters  Notes  Medications  Labs  Result Review Imaging  Media :23}     Domo Fairchild presents to [facility] for Headache (Over a year).  History of Present Illness     He presents with complaint of increased headaches. He reports chronic constant nagging pain for more than a year (since covid).  It is generally improved with tylenol or aspirin but recurs when the medication wears off.  He reports increased headache pain starting about a week ago over the right frontal sinus and increased congestion.. He took stahist for his symptoms. He went to medical center at  for the increased headache and his blood pressure was elevated (140/94). He has had mildly elevated blood pressure medication but has never been on medication for blood pressure. He was on metoprolol in the past but this was for afib.  He is scheduled to have a CT of head next week to evaluate headache further.     He has a history of atrial fibrillation and was on metoprolol and xarelto in the past. He has had ablation and since ablation has had no further episodes, so he is not on any medications for a fib currently.     He also has a history of hyperlipidemia. His last LDL was 150. He is on rosuvastatin but has not taken in a few weeks, because he was taking hydroxyzine for rash and was concerned about possible interaction. HE reports no side effects when he was taking the medication.    Objective     Vital Signs:   /84   Pulse 111   Ht 180.3 cm (71\")   Wt 81.6 kg (179 lb 12.8 oz)   SpO2 97%   BMI 25.08 kg/m²   Body mass index is 25.08 kg/m².     Physical Exam  Constitutional:       General: He is not in acute distress.  Cardiovascular:      Rate and Rhythm: Normal rate and regular rhythm.      Heart sounds: No murmur heard.  Pulmonary:      Effort: No respiratory distress.     "  Breath sounds: Normal breath sounds.   Neurological:      General: No focal deficit present.      Mental Status: He is alert.   Psychiatric:         Behavior: Behavior normal.          Result Review  Data Reviewed:{ Labs  Result Review  Imaging  Med Tab  Media :23}                Assessment and Plan {CC Problem List  Visit Diagnosis  ROS  Review (Popup)  Health Maintenance  Quality  BestPractice  Medications  SmartSets  SnapShot Encounters  Media :23}   Diagnoses and all orders for this visit:    1. Primary hypertension (Primary)  Comments:  Take metoprolol daily and call if remains high.    2. Hypercholesterolemia  Comments:  Resume rosuvastatin.    3. Headache, unspecified headache type  Comments:  suspect recent worsening was due to sinus infection.  Keep CT as scheduled to evaluate chronic headache.        Patient Instructions   Resume the metoprolol daily. This can both lower blood pressure as well as help prevent headaches.  Monitor blood pressure outside the office. If above 130/80s call or return to office so that we can adjust medication.   Keep CT head as scheduled.  If sinus pressure worsens again, call and we can send antibiotic.  Resume the rosuvastatin.  Plan recheck and fasting labs in 2-3 months.  Call sooner if needed.      Patient was given instructions and counseling regarding his condition or for health maintenance advice on the AVS.       Return in about 3 months (around 4/30/2024) for Recheck.    Ngoc Amin MD

## 2024-01-31 NOTE — PATIENT INSTRUCTIONS
Resume the metoprolol daily. This can both lower blood pressure as well as help prevent headaches.  Monitor blood pressure outside the office. If above 130/80s call or return to office so that we can adjust medication.   Keep CT head as scheduled.  If sinus pressure worsens again, call and we can send antibiotic.  Resume the rosuvastatin.  Plan recheck and fasting labs in 2-3 months.  Call sooner if needed.

## 2024-02-07 ENCOUNTER — TELEPHONE (OUTPATIENT)
Dept: FAMILY MEDICINE CLINIC | Facility: CLINIC | Age: 59
End: 2024-02-07
Payer: COMMERCIAL

## 2024-02-07 NOTE — TELEPHONE ENCOUNTER
Caller: Domo Fairchild    Relationship: Self    Best call back number:     Domo Fairchild (Self) [phone] (Mobile)         What was the call regarding:     PATIENT STATED THAT HIS BLOOD PRESSURE HAS BEEN FROM :  133/82  TO  148/88     Is it okay if the provider responds through MyChart: PLEASE ADVISE

## 2024-02-08 NOTE — TELEPHONE ENCOUNTER
I would give the metoprolol 2-4 weeks and then if still high, can increase to 50mg daily. If not improving on 50 after 2-4 weeks, schedule follow up.

## 2024-05-08 ENCOUNTER — OFFICE VISIT (OUTPATIENT)
Dept: FAMILY MEDICINE CLINIC | Facility: CLINIC | Age: 59
End: 2024-05-08
Payer: COMMERCIAL

## 2024-05-08 VITALS
OXYGEN SATURATION: 96 % | SYSTOLIC BLOOD PRESSURE: 143 MMHG | HEIGHT: 71 IN | DIASTOLIC BLOOD PRESSURE: 90 MMHG | WEIGHT: 176.4 LBS | HEART RATE: 92 BPM | BODY MASS INDEX: 24.69 KG/M2

## 2024-05-08 DIAGNOSIS — D72.820 ELEVATED LYMPHOCYTE COUNT: ICD-10-CM

## 2024-05-08 DIAGNOSIS — R51.9 HEADACHE, UNSPECIFIED HEADACHE TYPE: ICD-10-CM

## 2024-05-08 DIAGNOSIS — E78.00 HYPERCHOLESTEROLEMIA: Primary | ICD-10-CM

## 2024-05-08 DIAGNOSIS — I10 PRIMARY HYPERTENSION: ICD-10-CM

## 2024-05-08 PROCEDURE — 99214 OFFICE O/P EST MOD 30 MIN: CPT | Performed by: FAMILY MEDICINE

## 2024-05-09 LAB
ALBUMIN SERPL-MCNC: 4.3 G/DL (ref 3.5–5.2)
ALBUMIN/GLOB SERPL: 1.8 G/DL
ALP SERPL-CCNC: 104 U/L (ref 39–117)
ALT SERPL-CCNC: 14 U/L (ref 1–41)
AST SERPL-CCNC: 15 U/L (ref 1–40)
BASOPHILS # BLD AUTO: 0.04 10*3/MM3 (ref 0–0.2)
BASOPHILS NFR BLD AUTO: 0.6 % (ref 0–1.5)
BILIRUB SERPL-MCNC: 0.5 MG/DL (ref 0–1.2)
BUN SERPL-MCNC: 10 MG/DL (ref 6–20)
BUN/CREAT SERPL: 13 (ref 7–25)
CALCIUM SERPL-MCNC: 9.1 MG/DL (ref 8.6–10.5)
CHLORIDE SERPL-SCNC: 103 MMOL/L (ref 98–107)
CHOLEST SERPL-MCNC: 229 MG/DL (ref 0–200)
CO2 SERPL-SCNC: 26.7 MMOL/L (ref 22–29)
CREAT SERPL-MCNC: 0.77 MG/DL (ref 0.76–1.27)
EGFRCR SERPLBLD CKD-EPI 2021: 105.1 ML/MIN/1.73
EOSINOPHIL # BLD AUTO: 0.15 10*3/MM3 (ref 0–0.4)
EOSINOPHIL NFR BLD AUTO: 2.2 % (ref 0.3–6.2)
ERYTHROCYTE [DISTWIDTH] IN BLOOD BY AUTOMATED COUNT: 12.8 % (ref 12.3–15.4)
GLOBULIN SER CALC-MCNC: 2.4 GM/DL
GLUCOSE SERPL-MCNC: 102 MG/DL (ref 65–99)
HCT VFR BLD AUTO: 44.4 % (ref 37.5–51)
HDLC SERPL-MCNC: 44 MG/DL (ref 40–60)
HGB BLD-MCNC: 15 G/DL (ref 13–17.7)
IMM GRANULOCYTES # BLD AUTO: 0.02 10*3/MM3 (ref 0–0.05)
IMM GRANULOCYTES NFR BLD AUTO: 0.3 % (ref 0–0.5)
LDLC SERPL CALC-MCNC: 162 MG/DL (ref 0–100)
LYMPHOCYTES # BLD AUTO: 2.84 10*3/MM3 (ref 0.7–3.1)
LYMPHOCYTES NFR BLD AUTO: 42.1 % (ref 19.6–45.3)
MCH RBC QN AUTO: 32.1 PG (ref 26.6–33)
MCHC RBC AUTO-ENTMCNC: 33.8 G/DL (ref 31.5–35.7)
MCV RBC AUTO: 94.9 FL (ref 79–97)
MONOCYTES # BLD AUTO: 0.44 10*3/MM3 (ref 0.1–0.9)
MONOCYTES NFR BLD AUTO: 6.5 % (ref 5–12)
NEUTROPHILS # BLD AUTO: 3.26 10*3/MM3 (ref 1.7–7)
NEUTROPHILS NFR BLD AUTO: 48.3 % (ref 42.7–76)
NRBC BLD AUTO-RTO: 0 /100 WBC (ref 0–0.2)
PLATELET # BLD AUTO: 261 10*3/MM3 (ref 140–450)
POTASSIUM SERPL-SCNC: 4.5 MMOL/L (ref 3.5–5.2)
PROT SERPL-MCNC: 6.7 G/DL (ref 6–8.5)
RBC # BLD AUTO: 4.68 10*6/MM3 (ref 4.14–5.8)
SODIUM SERPL-SCNC: 139 MMOL/L (ref 136–145)
TRIGL SERPL-MCNC: 127 MG/DL (ref 0–150)
VLDLC SERPL CALC-MCNC: 23 MG/DL (ref 5–40)
WBC # BLD AUTO: 6.75 10*3/MM3 (ref 3.4–10.8)

## 2024-05-10 RX ORDER — ROSUVASTATIN CALCIUM 40 MG/1
40 TABLET, COATED ORAL DAILY
Qty: 90 TABLET | Refills: 1 | Status: SHIPPED | OUTPATIENT
Start: 2024-05-10

## 2024-08-08 ENCOUNTER — OFFICE VISIT (OUTPATIENT)
Dept: FAMILY MEDICINE CLINIC | Facility: CLINIC | Age: 59
End: 2024-08-08
Payer: COMMERCIAL

## 2024-08-08 VITALS
WEIGHT: 172.7 LBS | DIASTOLIC BLOOD PRESSURE: 88 MMHG | HEIGHT: 71 IN | OXYGEN SATURATION: 96 % | SYSTOLIC BLOOD PRESSURE: 134 MMHG | HEART RATE: 86 BPM | BODY MASS INDEX: 24.18 KG/M2

## 2024-08-08 DIAGNOSIS — I10 PRIMARY HYPERTENSION: ICD-10-CM

## 2024-08-08 DIAGNOSIS — F17.200 CURRENT SMOKER: ICD-10-CM

## 2024-08-08 DIAGNOSIS — I48.0 PAF (PAROXYSMAL ATRIAL FIBRILLATION): Chronic | ICD-10-CM

## 2024-08-08 DIAGNOSIS — E78.00 HYPERCHOLESTEROLEMIA: ICD-10-CM

## 2024-08-08 DIAGNOSIS — Z12.5 SCREENING PSA (PROSTATE SPECIFIC ANTIGEN): ICD-10-CM

## 2024-08-08 DIAGNOSIS — Z00.00 WELL ADULT EXAM: Primary | ICD-10-CM

## 2024-08-08 PROCEDURE — 90471 IMMUNIZATION ADMIN: CPT | Performed by: FAMILY MEDICINE

## 2024-08-08 PROCEDURE — 99214 OFFICE O/P EST MOD 30 MIN: CPT | Performed by: FAMILY MEDICINE

## 2024-08-08 PROCEDURE — 90677 PCV20 VACCINE IM: CPT | Performed by: FAMILY MEDICINE

## 2024-08-08 PROCEDURE — 99396 PREV VISIT EST AGE 40-64: CPT | Performed by: FAMILY MEDICINE

## 2024-08-08 RX ORDER — ROSUVASTATIN CALCIUM 40 MG/1
40 TABLET, COATED ORAL DAILY
Qty: 90 TABLET | Refills: 1 | Status: SHIPPED | OUTPATIENT
Start: 2024-08-08

## 2024-08-08 RX ORDER — METOPROLOL SUCCINATE 25 MG/1
25 TABLET, EXTENDED RELEASE ORAL DAILY
Qty: 90 TABLET | Refills: 3 | Status: SHIPPED | OUTPATIENT
Start: 2024-08-08

## 2024-08-08 NOTE — PATIENT INSTRUCTIONS
Continue your current medications.   Aim for 150 minutes of aerobic exercise weekly.  You had lab tests today. You should receive a call or my chart message with your test results. If you have not received your results in the next 7-10 days, please contact the office.    Colonoscopy is up-to-date.  This is next due in 2027.  Chest CT to screen for lung cancer is recommended yearly.  We will schedule.  If you have not heard anything regarding your appointment in the next 2 weeks, let us know.  Continue efforts to wean smoking and hopefully stop entirely.  If you are struggling and need help please reach out as there are medications, replacement products, and support groups that can be helpful.

## 2024-08-08 NOTE — PROGRESS NOTES
Chief Complaint  Annual Exam    Subjective    History of Present Illness {CC  Problem List  Visit  Diagnosis   Encounters  Notes  Medications  Labs  Result Review Imaging  Media :23}     Domo Fairchild presents to [facility] for Annual Exam.  He is single and lives with caden and her son. He works as a manager at [employer]. His last colonoscopy was 10/2022 and had 1 large polyp and was told to recheck in 5 years. His last PSA was a year ago and was normal. He is a current smoker. He is trying to decrease but is now vaping also to try to wean.  His last chest CT was a year ago and showed mild emphysema changes but no lung mass.  It does show an adrenal tumor that has been stable.  For exercise, he gets 10,000+ steps at work and walks occasionally outside of work. He is up to date on routine dental and eye exams.     Their chronic medical conditions were reviewed and are stable unless noted otherwise below.    He is here for follow up of hyperlipidemia.  He is currently on rosuvastatin 40 mg daily.  He reports good compliance and tolerability of the medication.  His last cholesterol was 229 with LDL of 162.  At that time his rosuvastatin dose was increased.  He is fasting today for repeat labs.    He is also here for follow-up of hypertension.  He is currently on metoprolol 2 daily.  His blood pressure has been well-controlled on current medication.  He denies any dizziness or chest pain.  He also takes the metoprolol for his history of paroxysmal atrial fibrillation.  His last ablation was in 2021 and he has done well since then. He denies any recent flares of palpitations.     History of Present Illness     Social History     Socioeconomic History    Marital status: Single    Years of education: College   Tobacco Use    Smoking status: Every Day     Current packs/day: 1.00     Average packs/day: 1 pack/day for 41.0 years (41.0 ttl pk-yrs)     Types: Cigarettes     Start date:  "8/1/1983    Smokeless tobacco: Never    Tobacco comments:     Started in 1983 @ 18   Vaping Use    Vaping status: Every Day    Substances: Nicotine   Substance and Sexual Activity    Alcohol use: Yes     Alcohol/week: 2.0 standard drinks of alcohol     Types: 2 Shots of liquor per week     Comment: couple of bourbon drinks weekly    Drug use: No    Sexual activity: Not Currently     Partners: Female     Birth control/protection: Surgical        Review of Systems   Constitutional:  Negative for fatigue.   HENT:  Negative for ear pain and sinus pain.    Eyes:  Negative for pain and visual disturbance.   Respiratory:  Negative for cough and shortness of breath.    Cardiovascular:  Negative for chest pain and palpitations.   Gastrointestinal:  Negative for abdominal pain and constipation.   Genitourinary:  Negative for dysuria.   Musculoskeletal:  Negative for arthralgias.   Skin:  Negative for color change.   Allergic/Immunologic: Negative for environmental allergies.   Neurological:  Negative for dizziness.   Psychiatric/Behavioral:  Negative for dysphoric mood. The patient is not nervous/anxious.         Objective       Vital Signs:   /88   Pulse 86   Ht 180.3 cm (71\")   Wt 78.3 kg (172 lb 11.2 oz)   SpO2 96%   BMI 24.09 kg/m²     Body mass index is 24.09 kg/m².     PHQ-9 Depression Screening  Little interest or pleasure in doing things? 0-->not at all   Feeling down, depressed, or hopeless? 0-->not at all   Trouble falling or staying asleep, or sleeping too much?     Feeling tired or having little energy?     Poor appetite or overeating?     Feeling bad about yourself - or that you are a failure or have let yourself or your family down?     Trouble concentrating on things, such as reading the newspaper or watching television?     Moving or speaking so slowly that other people could have noticed? Or the opposite - being so fidgety or restless that you have been moving around a lot more than usual?   "   Thoughts that you would be better off dead, or of hurting yourself in some way?     PHQ-9 Total Score 0   If you checked off any problems, how difficult have these problems made it for you to do your work, take care of things at home, or get along with other people?           Physical Exam  Constitutional:       General: He is not in acute distress.  HENT:      Head: Normocephalic and atraumatic.      Nose: No rhinorrhea.      Mouth/Throat:      Mouth: Mucous membranes are moist.   Cardiovascular:      Rate and Rhythm: Normal rate and regular rhythm.      Pulses: Normal pulses.      Heart sounds: No murmur heard.  Pulmonary:      Effort: No respiratory distress.      Breath sounds: Normal breath sounds.   Abdominal:      General: There is no distension.      Palpations: Abdomen is soft.      Tenderness: There is no abdominal tenderness.   Musculoskeletal:      Right lower leg: No edema.      Left lower leg: No edema.   Lymphadenopathy:      Cervical: No cervical adenopathy.   Skin:     General: Skin is warm.      Findings: No lesion.   Neurological:      General: No focal deficit present.      Mental Status: He is alert.   Psychiatric:         Behavior: Behavior normal.          Result Review  Data Reviewed:{ Labs  Result Review  Imaging  Med Tab  Media :23}   CMP          5/8/2024    16:03   CMP   Glucose 102    BUN 10    Creatinine 0.77    Sodium 139    Potassium 4.5    Chloride 103    Calcium 9.1    Total Protein 6.7    Albumin 4.3    Globulin 2.4    Total Bilirubin 0.5    Alkaline Phosphatase 104    AST (SGOT) 15    ALT (SGPT) 14    BUN/Creatinine Ratio 13.0      Lipid Panel          5/8/2024    16:03   Lipid Panel   Total Cholesterol 229    Triglycerides 127    HDL Cholesterol 44    VLDL Cholesterol 23    LDL Cholesterol  162                 Assessment and Plan {CC Problem List  Visit Diagnosis  ROS  Review (Popup)  Health Maintenance  Quality  BestPractice  Medications  SmartSets  SnapShot  Encounters  Media :23}   Diagnoses and all orders for this visit:    1. Well adult exam (Primary)  -     CBC & Differential  -     Comprehensive Metabolic Panel  -     Lipid Panel  -     TSH Rfx On Abnormal To Free T4    2. Hypercholesterolemia  Comments:  Continue rosuvastatin 40.  Orders:  -     Lipid Panel    3. Primary hypertension  Comments:  Continue metoprolol.  Orders:  -     Comprehensive Metabolic Panel    4. Current smoker  Comments:  Discussed risks associated with smoking and encouraged cessation of smoking and vaping.  Orders:  -      CT Chest Low Dose Cancer Screening WO; Future    5. Screening PSA (prostate specific antigen)  -     PSA Screen    6. PAF (paroxysmal atrial fibrillation)  Comments:  Has done well since last ablation.  Continue metoprolol.    Other orders  -     Pneumococcal Conjugate Vaccine 20-Valent All  -     rosuvastatin (CRESTOR) 40 MG tablet; Take 1 tablet by mouth Daily.  Dispense: 90 tablet; Refill: 1  -     metoprolol succinate XL (TOPROL-XL) 25 MG 24 hr tablet; Take 1 tablet by mouth Daily.  Dispense: 90 tablet; Refill: 3        Patient Instructions   Continue your current medications.   Aim for 150 minutes of aerobic exercise weekly.  You had lab tests today. You should receive a call or my chart message with your test results. If you have not received your results in the next 7-10 days, please contact the office.    Colonoscopy is up-to-date.  This is next due in 2027.  Chest CT to screen for lung cancer is recommended yearly.  We will schedule.  If you have not heard anything regarding your appointment in the next 2 weeks, let us know.  Continue efforts to wean smoking and hopefully stop entirely.  If you are struggling and need help please reach out as there are medications, replacement products, and support groups that can be helpful.     Routine health maintenance, immunizations, and cancer screenings were discussed and encouraged.    Patient was given instructions and  counseling regarding his condition or for health maintenance advice on the AVS.       No follow-ups on file.    Ngoc Amin MD

## 2024-08-09 LAB
ALBUMIN SERPL-MCNC: 4.7 G/DL (ref 3.5–5.2)
ALBUMIN/GLOB SERPL: 1.9 G/DL
ALP SERPL-CCNC: 124 U/L (ref 39–117)
ALT SERPL-CCNC: 20 U/L (ref 1–41)
AST SERPL-CCNC: 21 U/L (ref 1–40)
BASOPHILS # BLD AUTO: 0.05 10*3/MM3 (ref 0–0.2)
BASOPHILS NFR BLD AUTO: 0.5 % (ref 0–1.5)
BILIRUB SERPL-MCNC: 0.5 MG/DL (ref 0–1.2)
BUN SERPL-MCNC: 12 MG/DL (ref 6–20)
BUN/CREAT SERPL: 13.5 (ref 7–25)
CALCIUM SERPL-MCNC: 9.8 MG/DL (ref 8.6–10.5)
CHLORIDE SERPL-SCNC: 99 MMOL/L (ref 98–107)
CHOLEST SERPL-MCNC: 192 MG/DL (ref 0–200)
CO2 SERPL-SCNC: 29 MMOL/L (ref 22–29)
CREAT SERPL-MCNC: 0.89 MG/DL (ref 0.76–1.27)
EGFRCR SERPLBLD CKD-EPI 2021: 100 ML/MIN/1.73
EOSINOPHIL # BLD AUTO: 0.16 10*3/MM3 (ref 0–0.4)
EOSINOPHIL NFR BLD AUTO: 1.7 % (ref 0.3–6.2)
ERYTHROCYTE [DISTWIDTH] IN BLOOD BY AUTOMATED COUNT: 13 % (ref 12.3–15.4)
GLOBULIN SER CALC-MCNC: 2.5 GM/DL
GLUCOSE SERPL-MCNC: 95 MG/DL (ref 65–99)
HCT VFR BLD AUTO: 49 % (ref 37.5–51)
HDLC SERPL-MCNC: 59 MG/DL (ref 40–60)
HGB BLD-MCNC: 16.8 G/DL (ref 13–17.7)
IMM GRANULOCYTES # BLD AUTO: 0.03 10*3/MM3 (ref 0–0.05)
IMM GRANULOCYTES NFR BLD AUTO: 0.3 % (ref 0–0.5)
LDLC SERPL CALC-MCNC: 112 MG/DL (ref 0–100)
LYMPHOCYTES # BLD AUTO: 3.06 10*3/MM3 (ref 0.7–3.1)
LYMPHOCYTES NFR BLD AUTO: 33.2 % (ref 19.6–45.3)
MCH RBC QN AUTO: 32.3 PG (ref 26.6–33)
MCHC RBC AUTO-ENTMCNC: 34.3 G/DL (ref 31.5–35.7)
MCV RBC AUTO: 94.2 FL (ref 79–97)
MONOCYTES # BLD AUTO: 0.54 10*3/MM3 (ref 0.1–0.9)
MONOCYTES NFR BLD AUTO: 5.9 % (ref 5–12)
NEUTROPHILS # BLD AUTO: 5.37 10*3/MM3 (ref 1.7–7)
NEUTROPHILS NFR BLD AUTO: 58.4 % (ref 42.7–76)
NRBC BLD AUTO-RTO: 0 /100 WBC (ref 0–0.2)
PLATELET # BLD AUTO: 269 10*3/MM3 (ref 140–450)
POTASSIUM SERPL-SCNC: 4.9 MMOL/L (ref 3.5–5.2)
PROT SERPL-MCNC: 7.2 G/DL (ref 6–8.5)
PSA SERPL-MCNC: 0.46 NG/ML (ref 0–4)
RBC # BLD AUTO: 5.2 10*6/MM3 (ref 4.14–5.8)
SODIUM SERPL-SCNC: 136 MMOL/L (ref 136–145)
TRIGL SERPL-MCNC: 117 MG/DL (ref 0–150)
TSH SERPL DL<=0.005 MIU/L-ACNC: 1.56 UIU/ML (ref 0.27–4.2)
VLDLC SERPL CALC-MCNC: 21 MG/DL (ref 5–40)
WBC # BLD AUTO: 9.21 10*3/MM3 (ref 3.4–10.8)

## 2024-08-21 RX ORDER — ROSUVASTATIN CALCIUM 40 MG/1
40 TABLET, COATED ORAL DAILY
Qty: 90 TABLET | Refills: 1 | Status: SHIPPED | OUTPATIENT
Start: 2024-08-21

## 2024-09-27 ENCOUNTER — PATIENT MESSAGE (OUTPATIENT)
Dept: FAMILY MEDICINE CLINIC | Facility: CLINIC | Age: 59
End: 2024-09-27
Payer: COMMERCIAL

## 2024-10-23 ENCOUNTER — HOSPITAL ENCOUNTER (OUTPATIENT)
Facility: HOSPITAL | Age: 59
Discharge: HOME | End: 2024-10-23
Admitting: FAMILY MEDICINE
Payer: COMMERCIAL

## 2024-10-23 DIAGNOSIS — F17.200 CURRENT SMOKER: ICD-10-CM

## 2024-10-23 PROCEDURE — 71271 CT THORAX LUNG CANCER SCR C-: CPT

## 2024-10-28 DIAGNOSIS — R93.89 ABNORMAL SCREENING CT OF CHEST: Primary | ICD-10-CM

## 2024-10-28 DIAGNOSIS — F17.200 CURRENT SMOKER: ICD-10-CM

## 2025-01-02 ENCOUNTER — TELEPHONE (OUTPATIENT)
Dept: CARDIOLOGY | Facility: CLINIC | Age: 60
End: 2025-01-02

## 2025-01-02 DIAGNOSIS — I48.0 PAF (PAROXYSMAL ATRIAL FIBRILLATION): Primary | Chronic | ICD-10-CM

## 2025-01-02 NOTE — TELEPHONE ENCOUNTER
Chart review shows the following:      Reviewed message with Domo Fairchild and he is agreeable to this plan.  Transferred call to Lety in scheduling to arrange monitor.  Monitor is scheduled for 1/7/2025.    Please let me know if there is anything else you would like me to do for this patient.    Thank you,  Alondra CRANE RN  Triage Nurse HAILE  01/02/25   13:14 EST

## 2025-01-02 NOTE — TELEPHONE ENCOUNTER
Caller: Domo Fairchild     Best call back number: [phone]     What is your medical concern? 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

## 2025-01-09 ENCOUNTER — OFFICE VISIT (OUTPATIENT)
Dept: FAMILY MEDICINE CLINIC | Facility: CLINIC | Age: 60
End: 2025-01-09
Payer: COMMERCIAL

## 2025-01-09 VITALS
DIASTOLIC BLOOD PRESSURE: 85 MMHG | HEART RATE: 92 BPM | OXYGEN SATURATION: 96 % | HEIGHT: 71 IN | SYSTOLIC BLOOD PRESSURE: 147 MMHG | BODY MASS INDEX: 25.79 KG/M2 | WEIGHT: 184.2 LBS

## 2025-01-09 DIAGNOSIS — I10 PRIMARY HYPERTENSION: ICD-10-CM

## 2025-01-09 DIAGNOSIS — R00.2 PALPITATION: Primary | ICD-10-CM

## 2025-01-09 DIAGNOSIS — I48.0 PAF (PAROXYSMAL ATRIAL FIBRILLATION): ICD-10-CM

## 2025-01-09 PROCEDURE — 99214 OFFICE O/P EST MOD 30 MIN: CPT | Performed by: FAMILY MEDICINE

## 2025-01-09 RX ORDER — METOPROLOL SUCCINATE 100 MG/1
100 TABLET, EXTENDED RELEASE ORAL DAILY
Qty: 30 TABLET | Refills: 1 | Status: SHIPPED | OUTPATIENT
Start: 2025-01-09

## 2025-01-09 NOTE — PROGRESS NOTES
"Chief Complaint  Hypertension    Subjective    History of Present Illness {CC  Problem List  Visit  Diagnosis   Encounters  Notes  Medications  Labs  Result Review Imaging  Media :23}     Domo Fairchild presents to [facility] for Hypertension.  History of Present Illness   He presents for follow up of hypertension. His blood pressure outside the office has been  elevated the past 6 weeks, 130-140/80-90s. He is currently on metoprolol XL 50mg daily. He also has a history of atrial fibrillation and had ablation in 6/2021.  Despite the metoprolol, the past few days his heart rate has been elevated in 110s. He saw medical at work andHe sees cardiologist, Dr. Finch. He had zio patch placed 2 days ago. His next follow up is scheduled in March. He denies any chest pain or dizziness.      Objective     Vital Signs:   /85   Pulse 92   Ht 180.3 cm (71\")   Wt 83.6 kg (184 lb 3.2 oz)   SpO2 96%   BMI 25.69 kg/m²   Body mass index is 25.69 kg/m².     Physical Exam  Constitutional:       General: He is not in acute distress.  Cardiovascular:      Rate and Rhythm: Rhythm irregular.      Heart sounds: No murmur heard.  Pulmonary:      Effort: No respiratory distress.      Breath sounds: Normal breath sounds.   Neurological:      General: No focal deficit present.      Mental Status: He is alert.   Psychiatric:         Behavior: Behavior normal.          Result Review  Data Reviewed:{ Labs  Result Review  Imaging  Med Tab  Media :23}                Assessment and Plan {CC Problem List  Visit Diagnosis  ROS  Review (Popup)  Health Maintenance  Quality  BestPractice  Medications  SmartSets  SnapShot Encounters  Media :23}   Diagnoses and all orders for this visit:    1. Palpitation (Primary)  -     CBC & Differential  -     Comprehensive Metabolic Panel  -     TSH Rfx On Abnormal To Free T4    2. PAF (paroxysmal atrial fibrillation)    3. Primary hypertension    Other " orders  -     metoprolol succinate XL (Toprol XL) 100 MG 24 hr tablet; Take 1 tablet by mouth Daily.  Dispense: 30 tablet; Refill: 1        Patient Instructions   Increase metoprolol to 100mg daily.   You had lab tests today. You should receive a call or my chart message with your test results. If you have not received your results in the next 7-10 days, please contact the office.    Complete zio patch monitor.  Call Dr. Finch to schedule sooner follow up.   If chest pain, lightheadedness or heart rate remains above 120 despite medication, go to ER.     Patient was given instructions and counseling regarding his condition or for health maintenance advice on the AVS.       No follow-ups on file.    Ngoc Amin MD

## 2025-01-09 NOTE — PATIENT INSTRUCTIONS
Increase metoprolol to 100mg daily.   You had lab tests today. You should receive a call or my chart message with your test results. If you have not received your results in the next 7-10 days, please contact the office.    Complete zio patch monitor.  Call Dr. Finch to schedule sooner follow up.   If chest pain, lightheadedness or heart rate remains above 120 despite medication, go to ER.

## 2025-01-10 LAB
ALBUMIN SERPL-MCNC: 4.7 G/DL (ref 3.8–4.9)
ALP SERPL-CCNC: 108 IU/L (ref 44–121)
ALT SERPL-CCNC: 50 IU/L (ref 0–44)
AST SERPL-CCNC: 41 IU/L (ref 0–40)
BASOPHILS # BLD AUTO: 0 X10E3/UL (ref 0–0.2)
BASOPHILS NFR BLD AUTO: 1 %
BILIRUB SERPL-MCNC: 0.5 MG/DL (ref 0–1.2)
BUN SERPL-MCNC: 8 MG/DL (ref 6–24)
BUN/CREAT SERPL: 9 (ref 9–20)
CALCIUM SERPL-MCNC: 9.5 MG/DL (ref 8.7–10.2)
CHLORIDE SERPL-SCNC: 103 MMOL/L (ref 96–106)
CO2 SERPL-SCNC: 25 MMOL/L (ref 20–29)
CREAT SERPL-MCNC: 0.86 MG/DL (ref 0.76–1.27)
EGFRCR SERPLBLD CKD-EPI 2021: 101 ML/MIN/1.73
EOSINOPHIL # BLD AUTO: 0.1 X10E3/UL (ref 0–0.4)
EOSINOPHIL NFR BLD AUTO: 2 %
ERYTHROCYTE [DISTWIDTH] IN BLOOD BY AUTOMATED COUNT: 13 % (ref 11.6–15.4)
GLOBULIN SER CALC-MCNC: 2.7 G/DL (ref 1.5–4.5)
GLUCOSE SERPL-MCNC: 97 MG/DL (ref 70–99)
HCT VFR BLD AUTO: 43.6 % (ref 37.5–51)
HGB BLD-MCNC: 14.9 G/DL (ref 13–17.7)
IMM GRANULOCYTES # BLD AUTO: 0 X10E3/UL (ref 0–0.1)
IMM GRANULOCYTES NFR BLD AUTO: 0 %
LYMPHOCYTES # BLD AUTO: 3.2 X10E3/UL (ref 0.7–3.1)
LYMPHOCYTES NFR BLD AUTO: 45 %
MCH RBC QN AUTO: 32.3 PG (ref 26.6–33)
MCHC RBC AUTO-ENTMCNC: 34.2 G/DL (ref 31.5–35.7)
MCV RBC AUTO: 94 FL (ref 79–97)
MONOCYTES # BLD AUTO: 0.5 X10E3/UL (ref 0.1–0.9)
MONOCYTES NFR BLD AUTO: 7 %
NEUTROPHILS # BLD AUTO: 3.2 X10E3/UL (ref 1.4–7)
NEUTROPHILS NFR BLD AUTO: 45 %
PLATELET # BLD AUTO: 238 X10E3/UL (ref 150–450)
POTASSIUM SERPL-SCNC: 4.8 MMOL/L (ref 3.5–5.2)
PROT SERPL-MCNC: 7.4 G/DL (ref 6–8.5)
RBC # BLD AUTO: 4.62 X10E6/UL (ref 4.14–5.8)
SODIUM SERPL-SCNC: 142 MMOL/L (ref 134–144)
TSH SERPL DL<=0.005 MIU/L-ACNC: 1.63 UIU/ML (ref 0.45–4.5)
WBC # BLD AUTO: 7.1 X10E3/UL (ref 3.4–10.8)

## 2025-01-29 ENCOUNTER — HOSPITAL ENCOUNTER (OUTPATIENT)
Facility: HOSPITAL | Age: 60
Discharge: HOME | End: 2025-01-29
Admitting: FAMILY MEDICINE
Payer: COMMERCIAL

## 2025-01-29 DIAGNOSIS — F17.200 CURRENT SMOKER: ICD-10-CM

## 2025-01-29 DIAGNOSIS — R93.89 ABNORMAL SCREENING CT OF CHEST: ICD-10-CM

## 2025-01-29 PROCEDURE — 71271 CT THORAX LUNG CANCER SCR C-: CPT

## 2025-02-06 ENCOUNTER — TELEPHONE (OUTPATIENT)
Age: 60
End: 2025-02-06
Payer: COMMERCIAL

## 2025-02-06 NOTE — TELEPHONE ENCOUNTER
----- Message from Rory Bustillo sent at 2/6/2025 10:20 AM EST -----  Can you let him know that he is not having any A fib. He had some non-sustained atrial activity which is a common finding, there is currently nothing to do. He needs to keep appt with Dr. Finch in march.  ----- Message -----  From: Bryn Finch MD  Sent: 2/6/2025   9:59 AM EST  To: JULIO Saldana

## 2025-03-11 ENCOUNTER — OFFICE VISIT (OUTPATIENT)
Age: 60
End: 2025-03-11
Payer: COMMERCIAL

## 2025-03-11 VITALS — BODY MASS INDEX: 24.92 KG/M2 | WEIGHT: 178 LBS | HEIGHT: 71 IN

## 2025-03-11 DIAGNOSIS — I48.0 PAF (PAROXYSMAL ATRIAL FIBRILLATION): Primary | Chronic | ICD-10-CM

## 2025-03-11 PROCEDURE — 93000 ELECTROCARDIOGRAM COMPLETE: CPT | Performed by: INTERNAL MEDICINE

## 2025-03-11 PROCEDURE — 99213 OFFICE O/P EST LOW 20 MIN: CPT | Performed by: INTERNAL MEDICINE

## 2025-03-11 NOTE — PROGRESS NOTES
Date of Office Visit: 2025  Encounter Provider: Bryn Finch MD  Place of Service: [facility]  Patient Name: Domo Fairchild  :1965    Chief Complaint   Patient presents with    paroxysmal AFIB   :     HPI: Domo Fairchild is a 59 y.o. male who presents today for atrial fibrillation, palpitations    He has a history of paroxysmal atrial fibrillation.  He had prior ablation before I met him and then I did an redo ablation in  where I found connected the left veins.  They were isolated.  He has not had any manifest atrial fibrillation since ablation.    I guess over the past couple months he had some episodes of noted an elevated heart rate, by report he feels like he had another episode that lasted for about a day.  He wore a Zio patch which showed only some short SVT episodes the longest lasting about 1 minute.    His chads Vascor is 0, he is not on anticoagulation.  He feels that symptoms have been better over the past month.          Past Medical History:   Diagnosis Date    Adenoma of left adrenal gland 2023    benign    Atrial fibrillation     Depression     Hyperlipidemia 2023    PAF (paroxysmal atrial fibrillation)     Underwent an ablation procedure in .    Personal history of other diseases of circulatory system 2022    SOB (shortness of breath)     Tachycardia     Tobacco use        Past Surgical History:   Procedure Laterality Date    APPENDECTOMY  1978    CARDIAC ABLATION  2010    [facility]    CARDIAC ELECTROPHYSIOLOGY PROCEDURE N/A 2021    Procedure: Ablation atrial fibrillation;  Surgeon: Bryn Finch MD;  Location: [facility] INVASIVE LOCATION;  Service: Cardiovascular;  Laterality: N/A;    COLONOSCOPY  10/07/2022    HERNIA REPAIR Left 2006    Inguinal hernia, Dr. Coleman Hatch    HERNIA REPAIR Right 2014    Inguinal hernia, Dr. Coleman Hatch    INGUINAL HERNIA REPAIR   "03/29/2007    Coleman Cruz    INGUINAL HERNIA REPAIR  6/2007 and 2/2014    Kassie had two, dates not exact    VASECTOMY  07/1997       Social History     Socioeconomic History    Marital status: Single    Years of education: College   Tobacco Use    Smoking status: Every Day     Current packs/day: 1.00     Average packs/day: 1 pack/day for 41.6 years (41.6 ttl pk-yrs)     Types: Cigarettes     Start date: 8/1/1983    Smokeless tobacco: Never    Tobacco comments:     Started in 1983 @ 18   Vaping Use    Vaping status: Every Day    Substances: Nicotine   Substance and Sexual Activity    Alcohol use: Yes     Alcohol/week: 2.0 standard drinks of alcohol     Types: 2 Shots of liquor per week     Comment: couple of bourbon drinks weekly    Drug use: No    Sexual activity: Not Currently     Partners: Female     Birth control/protection: Surgical       Family History   Adopted: Yes       Review of Systems   Constitutional: Negative.   Cardiovascular: Negative.  Positive for palpitations.   Respiratory: Negative.     Gastrointestinal: Negative.        Allergies   Allergen Reactions    Penicillins Shortness Of Breath and Swelling         Current Outpatient Medications:     metoprolol succinate XL (Toprol XL) 100 MG 24 hr tablet, Take 1 tablet by mouth Daily., Disp: 30 tablet, Rfl: 1    rosuvastatin (CRESTOR) 40 MG tablet, TAKE 1 TABLET BY MOUTH EVERY DAY, Disp: 90 tablet, Rfl: 1      Objective:     Vitals:    03/11/25 0939   Weight: 80.7 kg (178 lb)   Height: 180.3 cm (71\")     Body mass index is 24.83 kg/m².    PHYSICAL EXAM:    Vitals and nursing note reviewed.   Constitutional:       General: Not in acute distress.  Pulmonary:      Effort: Pulmonary effort is normal. No respiratory distress.   Cardiovascular:      Normal rate. Regular rhythm.   Skin:     General: Skin is warm and dry.   Neurological:      Mental Status: Alert and oriented to person, place, and time.   Psychiatric:         Behavior: Behavior normal.         " Thought Content: Thought content normal.         Judgment: Judgment normal.             ECG 12 Lead    Date/Time: 3/11/2025 10:25 AM  Performed by: Bryn Finch MD    Authorized by: Bryn Finch MD  Comparison: compared with previous ECG from 9/7/2023  Rhythm: sinus rhythm            Assessment:       Diagnosis Plan   1. PAF (paroxysmal atrial fibrillation)               Plan:       On the monitor I think he is having some short burst of atrial tachycardia, it could also be atypical flutter.  Symptoms are currently better and I do not recommend any change of treatment unless they become more persistent.  His chads Vascor is 0 I do not think he needs to restart anticoagulation.  He will follow-up in 1 year.    As always, it has been a pleasure to participate in your patient's care.      Sincerely,         Bryn Finch MD

## 2025-03-16 RX ORDER — METOPROLOL SUCCINATE 100 MG/1
100 TABLET, EXTENDED RELEASE ORAL DAILY
Qty: 30 TABLET | Refills: 1 | Status: SHIPPED | OUTPATIENT
Start: 2025-03-16

## 2025-04-14 ENCOUNTER — OFFICE VISIT (OUTPATIENT)
Dept: FAMILY MEDICINE CLINIC | Facility: CLINIC | Age: 60
End: 2025-04-14
Payer: COMMERCIAL

## 2025-04-14 VITALS
OXYGEN SATURATION: 97 % | HEIGHT: 71 IN | SYSTOLIC BLOOD PRESSURE: 127 MMHG | DIASTOLIC BLOOD PRESSURE: 82 MMHG | BODY MASS INDEX: 25.24 KG/M2 | WEIGHT: 180.3 LBS | HEART RATE: 76 BPM

## 2025-04-14 DIAGNOSIS — E78.00 HYPERCHOLESTEROLEMIA: ICD-10-CM

## 2025-04-14 DIAGNOSIS — D72.820 ELEVATED LYMPHOCYTE COUNT: ICD-10-CM

## 2025-04-14 DIAGNOSIS — I48.0 PAF (PAROXYSMAL ATRIAL FIBRILLATION): ICD-10-CM

## 2025-04-14 DIAGNOSIS — R79.89 ELEVATED LIVER FUNCTION TESTS: Primary | ICD-10-CM

## 2025-04-14 PROCEDURE — 99214 OFFICE O/P EST MOD 30 MIN: CPT | Performed by: FAMILY MEDICINE

## 2025-04-14 NOTE — PROGRESS NOTES
"Chief Complaint  Hyperlipidemia    Subjective    History of Present Illness {CC  Problem List  Visit  Diagnosis   Encounters  Notes  Medications  Labs  Result Review Imaging  Media :23}     Domo Fairchild presents to [facility] for Hyperlipidemia.  History of Present Illness     He presents for follow up of palpitations. He has a history of paroxysmal atrial fibrillation. Since increasing metoprolol his palpitations have lessened.     He is also here for follow up of mild elevated liver function tests. He reports moderate alcohol consumption--on average 3-4 drinks a week (rum).    He is also here for follow up of lymphocytosis which has been mild and fluctuates He has seen hematologist in the past.     He also has a history of hyperlipidemia. He is currently on rosuvastatin 40mg daily. His last cholesterol was 192 with , HDL 59, and triglycerides of 117.     Objective     Vital Signs:   /82   Pulse 76   Ht 180.3 cm (71\")   Wt 81.8 kg (180 lb 4.8 oz)   SpO2 97%   BMI 25.15 kg/m²   Body mass index is 25.15 kg/m².     Physical Exam  Constitutional:       General: He is not in acute distress.  Cardiovascular:      Rate and Rhythm: Normal rate and regular rhythm.      Heart sounds: No murmur heard.  Pulmonary:      Effort: No respiratory distress.      Breath sounds: Normal breath sounds.   Neurological:      General: No focal deficit present.      Mental Status: He is alert.   Psychiatric:         Behavior: Behavior normal.          Result Review  Data Reviewed:{ Labs  Result Review  Imaging  Med Tab  Media :23}                Assessment and Plan {CC Problem List  Visit Diagnosis  ROS  Review (Popup)  Health Maintenance  Quality  BestPractice  Medications  SmartSets  SnapShot Encounters  Media :23}   Diagnoses and all orders for this visit:    1. Elevated liver function tests (Primary)  -     Comprehensive Metabolic Panel  -     Hepatitis Panel, " Acute    2. Elevated lymphocyte count  -     CBC & Differential    3. PAF (paroxysmal atrial fibrillation)  Comments:  stable on metoprolol.    4. Hypercholesterolemia  Comments:  continue rosuvastatin        Patient Instructions   Continue your current medications, healthy diet and regular exercise.  You had lab tests today. You should receive a call or my chart message with your test results. If you have not received your results in the next 7-10 days, please contact the office.    If liver function remains elevated, plan liver ultrasound.       Patient was given instructions and counseling regarding his condition or for health maintenance advice on the AVS.       Return in about 4 months (around 8/14/2025) for Annual physical.    Ngoc Amin MD

## 2025-04-14 NOTE — PATIENT INSTRUCTIONS
Continue your current medications, healthy diet and regular exercise.  You had lab tests today. You should receive a call or my chart message with your test results. If you have not received your results in the next 7-10 days, please contact the office.    If liver function remains elevated, plan liver ultrasound.

## 2025-04-15 LAB
ALBUMIN SERPL-MCNC: 4.3 G/DL (ref 3.8–4.9)
ALP SERPL-CCNC: 100 IU/L (ref 44–121)
ALT SERPL-CCNC: 35 IU/L (ref 0–44)
AST SERPL-CCNC: 27 IU/L (ref 0–40)
BASOPHILS # BLD AUTO: 0.1 X10E3/UL (ref 0–0.2)
BASOPHILS NFR BLD AUTO: 1 %
BILIRUB SERPL-MCNC: 0.2 MG/DL (ref 0–1.2)
BUN SERPL-MCNC: 16 MG/DL (ref 6–24)
BUN/CREAT SERPL: 20 (ref 9–20)
CALCIUM SERPL-MCNC: 9.7 MG/DL (ref 8.7–10.2)
CHLORIDE SERPL-SCNC: 104 MMOL/L (ref 96–106)
CO2 SERPL-SCNC: 27 MMOL/L (ref 20–29)
CREAT SERPL-MCNC: 0.82 MG/DL (ref 0.76–1.27)
EGFRCR SERPLBLD CKD-EPI 2021: 102 ML/MIN/1.73
EOSINOPHIL # BLD AUTO: 0.1 X10E3/UL (ref 0–0.4)
EOSINOPHIL NFR BLD AUTO: 2 %
ERYTHROCYTE [DISTWIDTH] IN BLOOD BY AUTOMATED COUNT: 12.4 % (ref 11.6–15.4)
GLOBULIN SER CALC-MCNC: 2.3 G/DL (ref 1.5–4.5)
GLUCOSE SERPL-MCNC: 99 MG/DL (ref 70–99)
HAV IGM SERPL QL IA: NEGATIVE
HBV CORE IGM SERPL QL IA: NEGATIVE
HBV SURFACE AG SERPL QL IA: NEGATIVE
HCT VFR BLD AUTO: 42.3 % (ref 37.5–51)
HCV AB SERPL QL IA: NON REACTIVE
HCV AB SERPL QL IA: NORMAL
HGB BLD-MCNC: 13.9 G/DL (ref 13–17.7)
IMM GRANULOCYTES # BLD AUTO: 0 X10E3/UL (ref 0–0.1)
IMM GRANULOCYTES NFR BLD AUTO: 0 %
LYMPHOCYTES # BLD AUTO: 2.7 X10E3/UL (ref 0.7–3.1)
LYMPHOCYTES NFR BLD AUTO: 42 %
MCH RBC QN AUTO: 31.1 PG (ref 26.6–33)
MCHC RBC AUTO-ENTMCNC: 32.9 G/DL (ref 31.5–35.7)
MCV RBC AUTO: 95 FL (ref 79–97)
MONOCYTES # BLD AUTO: 0.4 X10E3/UL (ref 0.1–0.9)
MONOCYTES NFR BLD AUTO: 7 %
NEUTROPHILS # BLD AUTO: 3.1 X10E3/UL (ref 1.4–7)
NEUTROPHILS NFR BLD AUTO: 48 %
PLATELET # BLD AUTO: 259 X10E3/UL (ref 150–450)
POTASSIUM SERPL-SCNC: 5.4 MMOL/L (ref 3.5–5.2)
PROT SERPL-MCNC: 6.6 G/DL (ref 6–8.5)
RBC # BLD AUTO: 4.47 X10E6/UL (ref 4.14–5.8)
SODIUM SERPL-SCNC: 140 MMOL/L (ref 134–144)
WBC # BLD AUTO: 6.3 X10E3/UL (ref 3.4–10.8)

## 2025-08-22 RX ORDER — METOPROLOL SUCCINATE 100 MG/1
100 TABLET, EXTENDED RELEASE ORAL DAILY
Qty: 30 TABLET | Refills: 1 | Status: SHIPPED | OUTPATIENT
Start: 2025-08-22

## (undated) DEVICE — Device: Brand: REFERENCE PATCH CARTO 3

## (undated) DEVICE — Device: Brand: THERMOCOOL SMARTTOUCH

## (undated) DEVICE — LOU EP: Brand: MEDLINE INDUSTRIES, INC.

## (undated) DEVICE — Device: Brand: SMARTABLATE

## (undated) DEVICE — Device: Brand: VIZIGO

## (undated) DEVICE — TORFLEX TRANSSEPTAL SHEATH; TRANSSEPTAL DILATOR; J-TIP GUIDEWIRE: Brand: TORFLEX TRANSSEPTAL GUIDING SHEATH

## (undated) DEVICE — Device: Brand: WEBSTER CS

## (undated) DEVICE — 1 X VERSACROSS STEERABLE SHEATH (INCLUDING  1 X DILATOR AND 1 X J-TIP GUIDEWIRE); 1 X VERSACROSS RF WIRE (INCLUDING 1 X CONNECTOR CABLE (SINGLE USE)); 1 X DISPERSIVE ELECTRODE: Brand: VERSACROSS STEERABLE ACCESS SOLUTION

## (undated) DEVICE — Device

## (undated) DEVICE — Device: Brand: SOUNDSTAR

## (undated) DEVICE — PERCLOSE PROGLIDE™ SUTURE-MEDIATED CLOSURE SYSTEM: Brand: PERCLOSE PROGLIDE™

## (undated) DEVICE — Device: Brand: PENTARAY NAV

## (undated) DEVICE — PINNACLE INTRODUCER SHEATH: Brand: PINNACLE

## (undated) DEVICE — BLANKT WARM UNDER/BDY FUL/ACC A/ 90X206CM